# Patient Record
Sex: MALE | Race: WHITE | NOT HISPANIC OR LATINO
[De-identification: names, ages, dates, MRNs, and addresses within clinical notes are randomized per-mention and may not be internally consistent; named-entity substitution may affect disease eponyms.]

---

## 2017-07-12 ENCOUNTER — APPOINTMENT (OUTPATIENT)
Dept: GASTROENTEROLOGY | Facility: CLINIC | Age: 67
End: 2017-07-12

## 2017-07-12 VITALS
BODY MASS INDEX: 29.4 KG/M2 | HEIGHT: 71 IN | WEIGHT: 210 LBS | HEART RATE: 65 BPM | SYSTOLIC BLOOD PRESSURE: 133 MMHG | OXYGEN SATURATION: 100 % | DIASTOLIC BLOOD PRESSURE: 96 MMHG | RESPIRATION RATE: 16 BRPM

## 2017-07-12 RX ORDER — VALSARTAN AND HYDROCHLOROTHIAZIDE 80; 12.5 MG/1; MG/1
80-12.5 TABLET, FILM COATED ORAL
Qty: 7 | Refills: 0 | Status: ACTIVE | COMMUNITY
Start: 2017-04-19

## 2017-07-12 RX ORDER — CARVEDILOL 6.25 MG/1
6.25 TABLET, FILM COATED ORAL
Qty: 60 | Refills: 0 | Status: ACTIVE | COMMUNITY
Start: 2017-02-19

## 2017-07-12 RX ORDER — RABEPRAZOLE SODIUM 20 MG/1
20 TABLET, DELAYED RELEASE ORAL
Qty: 30 | Refills: 0 | Status: ACTIVE | COMMUNITY
Start: 2017-02-19

## 2017-07-12 RX ORDER — CARVEDILOL 3.12 MG/1
3.12 TABLET, FILM COATED ORAL
Qty: 180 | Refills: 0 | Status: ACTIVE | COMMUNITY
Start: 2017-06-27

## 2017-07-12 RX ORDER — MENTHOL/CAMPHOR 0.5 %-0.5%
1000 LOTION (ML) TOPICAL
Refills: 0 | Status: ACTIVE | COMMUNITY

## 2017-09-13 ENCOUNTER — APPOINTMENT (OUTPATIENT)
Dept: GASTROENTEROLOGY | Facility: GI CENTER | Age: 67
End: 2017-09-13

## 2017-11-09 ENCOUNTER — APPOINTMENT (OUTPATIENT)
Dept: GASTROENTEROLOGY | Facility: CLINIC | Age: 67
End: 2017-11-09
Payer: MEDICARE

## 2017-11-09 VITALS
SYSTOLIC BLOOD PRESSURE: 116 MMHG | OXYGEN SATURATION: 98 % | HEART RATE: 75 BPM | RESPIRATION RATE: 16 BRPM | HEIGHT: 71 IN | WEIGHT: 222 LBS | BODY MASS INDEX: 31.08 KG/M2 | DIASTOLIC BLOOD PRESSURE: 91 MMHG

## 2017-11-09 DIAGNOSIS — I38 ENDOCARDITIS, VALVE UNSPECIFIED: ICD-10-CM

## 2017-11-09 DIAGNOSIS — F15.90 OTHER STIMULANT USE, UNSPECIFIED, UNCOMPLICATED: ICD-10-CM

## 2017-11-09 DIAGNOSIS — Z86.39 PERSONAL HISTORY OF OTHER ENDOCRINE, NUTRITIONAL AND METABOLIC DISEASE: ICD-10-CM

## 2017-11-09 DIAGNOSIS — Z86.79 PERSONAL HISTORY OF OTHER DISEASES OF THE CIRCULATORY SYSTEM: ICD-10-CM

## 2017-11-09 PROCEDURE — 99204 OFFICE O/P NEW MOD 45 MIN: CPT

## 2017-11-09 RX ORDER — CHOLECALCIFEROL (VITAMIN D3) 25 MCG
25 MCG CAPSULE ORAL
Refills: 0 | Status: ACTIVE | COMMUNITY

## 2017-11-09 RX ORDER — SIMVASTATIN 80 MG/1
80 TABLET, FILM COATED ORAL
Refills: 0 | Status: ACTIVE | COMMUNITY

## 2017-11-09 RX ORDER — VALSARTAN AND HYDROCHLOROTHIAZIDE 80; 12.5 MG/1; MG/1
80-12.5 TABLET, FILM COATED ORAL
Refills: 0 | Status: ACTIVE | COMMUNITY

## 2018-08-01 ENCOUNTER — APPOINTMENT (OUTPATIENT)
Dept: GASTROENTEROLOGY | Facility: CLINIC | Age: 68
End: 2018-08-01
Payer: MEDICARE

## 2018-08-01 VITALS
OXYGEN SATURATION: 98 % | DIASTOLIC BLOOD PRESSURE: 90 MMHG | HEART RATE: 74 BPM | BODY MASS INDEX: 30.8 KG/M2 | HEIGHT: 71 IN | RESPIRATION RATE: 16 BRPM | WEIGHT: 220 LBS | SYSTOLIC BLOOD PRESSURE: 110 MMHG

## 2018-08-01 DIAGNOSIS — Z86.79 PERSONAL HISTORY OF OTHER DISEASES OF THE CIRCULATORY SYSTEM: ICD-10-CM

## 2018-08-01 PROCEDURE — 99213 OFFICE O/P EST LOW 20 MIN: CPT

## 2018-08-02 LAB
ANION GAP SERPL CALC-SCNC: 14 MMOL/L
BASOPHILS # BLD AUTO: 0.02 K/UL
BASOPHILS NFR BLD AUTO: 0.2 %
BUN SERPL-MCNC: 19 MG/DL
CALCIUM SERPL-MCNC: 8.9 MG/DL
CHLORIDE SERPL-SCNC: 104 MMOL/L
CO2 SERPL-SCNC: 23 MMOL/L
CREAT SERPL-MCNC: 1.17 MG/DL
EOSINOPHIL # BLD AUTO: 0.11 K/UL
EOSINOPHIL NFR BLD AUTO: 1.3 %
GLUCOSE SERPL-MCNC: 102 MG/DL
HCT VFR BLD CALC: 44.2 %
HGB BLD-MCNC: 14.9 G/DL
IMM GRANULOCYTES NFR BLD AUTO: 0.2 %
INR PPP: 1.04 RATIO
LYMPHOCYTES # BLD AUTO: 1.41 K/UL
LYMPHOCYTES NFR BLD AUTO: 16.9 %
MAN DIFF?: NORMAL
MCHC RBC-ENTMCNC: 30.9 PG
MCHC RBC-ENTMCNC: 33.7 GM/DL
MCV RBC AUTO: 91.7 FL
MONOCYTES # BLD AUTO: 0.77 K/UL
MONOCYTES NFR BLD AUTO: 9.2 %
NEUTROPHILS # BLD AUTO: 6.03 K/UL
NEUTROPHILS NFR BLD AUTO: 72.2 %
PLATELET # BLD AUTO: 198 K/UL
POTASSIUM SERPL-SCNC: 4.1 MMOL/L
PT BLD: 11.8 SEC
RBC # BLD: 4.82 M/UL
RBC # FLD: 14.2 %
SODIUM SERPL-SCNC: 141 MMOL/L
WBC # FLD AUTO: 8.36 K/UL

## 2018-08-22 ENCOUNTER — RESULT REVIEW (OUTPATIENT)
Age: 68
End: 2018-08-22

## 2018-08-22 ENCOUNTER — APPOINTMENT (OUTPATIENT)
Dept: GASTROENTEROLOGY | Facility: GI CENTER | Age: 68
End: 2018-08-22
Payer: MEDICARE

## 2018-08-22 ENCOUNTER — OUTPATIENT (OUTPATIENT)
Dept: OUTPATIENT SERVICES | Facility: HOSPITAL | Age: 68
LOS: 1 days | End: 2018-08-22
Payer: MEDICARE

## 2018-08-22 DIAGNOSIS — K22.70 BARRETT'S ESOPHAGUS W/OUT DYSPLASIA: ICD-10-CM

## 2018-08-22 DIAGNOSIS — K22.70 BARRETT'S ESOPHAGUS WITHOUT DYSPLASIA: ICD-10-CM

## 2018-08-22 PROCEDURE — 43239 EGD BIOPSY SINGLE/MULTIPLE: CPT

## 2018-08-22 PROCEDURE — 88305 TISSUE EXAM BY PATHOLOGIST: CPT

## 2018-08-22 PROCEDURE — 88305 TISSUE EXAM BY PATHOLOGIST: CPT | Mod: 26

## 2018-08-27 LAB — SURGICAL PATHOLOGY FINAL REPORT - CH: SIGNIFICANT CHANGE UP

## 2018-09-30 ENCOUNTER — EMERGENCY (EMERGENCY)
Facility: HOSPITAL | Age: 68
LOS: 1 days | Discharge: HOME | End: 2018-09-30
Attending: EMERGENCY MEDICINE
Payer: MEDICARE

## 2018-09-30 VITALS
DIASTOLIC BLOOD PRESSURE: 102 MMHG | SYSTOLIC BLOOD PRESSURE: 178 MMHG | OXYGEN SATURATION: 97 % | RESPIRATION RATE: 20 BRPM | TEMPERATURE: 98 F | HEART RATE: 66 BPM

## 2018-09-30 VITALS
HEART RATE: 67 BPM | DIASTOLIC BLOOD PRESSURE: 78 MMHG | OXYGEN SATURATION: 98 % | RESPIRATION RATE: 18 BRPM | SYSTOLIC BLOOD PRESSURE: 134 MMHG | TEMPERATURE: 99 F

## 2018-09-30 LAB
ANION GAP SERPL CALC-SCNC: 12 MMOL/L — SIGNIFICANT CHANGE UP (ref 5–17)
APTT BLD: 41.7 SEC — HIGH (ref 27.5–37.4)
BUN SERPL-MCNC: 12 MG/DL — SIGNIFICANT CHANGE UP (ref 8–20)
CALCIUM SERPL-MCNC: 8.2 MG/DL — LOW (ref 8.6–10.2)
CHLORIDE SERPL-SCNC: 106 MMOL/L — SIGNIFICANT CHANGE UP (ref 98–107)
CO2 SERPL-SCNC: 22 MMOL/L — SIGNIFICANT CHANGE UP (ref 22–29)
CREAT SERPL-MCNC: 0.98 MG/DL — SIGNIFICANT CHANGE UP (ref 0.5–1.3)
GLUCOSE SERPL-MCNC: 105 MG/DL — SIGNIFICANT CHANGE UP (ref 70–115)
HCT VFR BLD CALC: 30.1 % — LOW (ref 42–52)
HGB BLD-MCNC: 10.1 G/DL — LOW (ref 14–18)
INR BLD: 3.78 RATIO — HIGH (ref 0.88–1.16)
MCHC RBC-ENTMCNC: 31.2 PG — HIGH (ref 27–31)
MCHC RBC-ENTMCNC: 33.6 G/DL — SIGNIFICANT CHANGE UP (ref 32–36)
MCV RBC AUTO: 92.9 FL — SIGNIFICANT CHANGE UP (ref 80–94)
PLATELET # BLD AUTO: 311 K/UL — SIGNIFICANT CHANGE UP (ref 150–400)
POTASSIUM SERPL-MCNC: 4.2 MMOL/L — SIGNIFICANT CHANGE UP (ref 3.5–5.3)
POTASSIUM SERPL-SCNC: 4.2 MMOL/L — SIGNIFICANT CHANGE UP (ref 3.5–5.3)
PROTHROM AB SERPL-ACNC: 42.7 SEC — HIGH (ref 9.8–12.7)
RBC # BLD: 3.24 M/UL — LOW (ref 4.6–6.2)
RBC # FLD: 15 % — SIGNIFICANT CHANGE UP (ref 11–15.6)
SODIUM SERPL-SCNC: 140 MMOL/L — SIGNIFICANT CHANGE UP (ref 135–145)
TROPONIN T SERPL-MCNC: <0.01 NG/ML — SIGNIFICANT CHANGE UP (ref 0–0.06)
TROPONIN T SERPL-MCNC: <0.01 NG/ML — SIGNIFICANT CHANGE UP (ref 0–0.06)
WBC # BLD: 9.9 K/UL — SIGNIFICANT CHANGE UP (ref 4.8–10.8)
WBC # FLD AUTO: 9.9 K/UL — SIGNIFICANT CHANGE UP (ref 4.8–10.8)

## 2018-09-30 PROCEDURE — 93005 ELECTROCARDIOGRAM TRACING: CPT

## 2018-09-30 PROCEDURE — 99284 EMERGENCY DEPT VISIT MOD MDM: CPT | Mod: 25

## 2018-09-30 PROCEDURE — 93306 TTE W/DOPPLER COMPLETE: CPT

## 2018-09-30 PROCEDURE — 71045 X-RAY EXAM CHEST 1 VIEW: CPT | Mod: 26

## 2018-09-30 PROCEDURE — 85027 COMPLETE CBC AUTOMATED: CPT

## 2018-09-30 PROCEDURE — 80048 BASIC METABOLIC PNL TOTAL CA: CPT

## 2018-09-30 PROCEDURE — 71045 X-RAY EXAM CHEST 1 VIEW: CPT

## 2018-09-30 PROCEDURE — 36415 COLL VENOUS BLD VENIPUNCTURE: CPT

## 2018-09-30 PROCEDURE — 93010 ELECTROCARDIOGRAM REPORT: CPT

## 2018-09-30 PROCEDURE — 99285 EMERGENCY DEPT VISIT HI MDM: CPT

## 2018-09-30 PROCEDURE — 85730 THROMBOPLASTIN TIME PARTIAL: CPT

## 2018-09-30 PROCEDURE — 85610 PROTHROMBIN TIME: CPT

## 2018-09-30 PROCEDURE — 84484 ASSAY OF TROPONIN QUANT: CPT

## 2018-09-30 RX ORDER — SODIUM CHLORIDE 9 MG/ML
3 INJECTION INTRAMUSCULAR; INTRAVENOUS; SUBCUTANEOUS ONCE
Qty: 0 | Refills: 0 | Status: COMPLETED | OUTPATIENT
Start: 2018-09-30 | End: 2018-09-30

## 2018-09-30 RX ADMIN — SODIUM CHLORIDE 3 MILLILITER(S): 9 INJECTION INTRAMUSCULAR; INTRAVENOUS; SUBCUTANEOUS at 09:09

## 2018-09-30 NOTE — ED ADULT NURSE REASSESSMENT NOTE - NS ED NURSE REASSESS COMMENT FT1
pt in no apparent distress, plan of care explained to pt, pt verbalized understanding. pt denies any pain at the moment.

## 2018-09-30 NOTE — ED ADULT NURSE NOTE - OBJECTIVE STATEMENT
pt AOX4 c/o chest pain that lasted 20 minutes that started when pt woke up, pt denies any SOB, N/V any radiation of pain to any part of his body, pt has recent aortic valve replaced. Pt denies any stents or heart attack in the past.

## 2018-09-30 NOTE — ED PROVIDER NOTE - PROGRESS NOTE DETAILS
PT COMFORTABLE THROUGHOUT STAY IN ER. ECHO PENDING. SECOND TROP NEGATIVE. GIRLFRIEND LEXA AT BEDSIDE AND RECALLS MD WAS DR MARIA, CT SURGERY [place] [id number]

## 2018-09-30 NOTE — ED PROVIDER NOTE - CHPI ED SYMPTOMS NEG
no shortness of breath/no cough/no back pain/no diaphoresis/no dizziness/no fever/no vomiting/no nausea/no syncope/no chills

## 2018-09-30 NOTE — ED PROVIDER NOTE - NSFOLLOWUPINSTRUCTIONS_ED_ALL_ED_FT
CALL DR MILES IN THE MORNING TO FIND OUT WHAT HE WANTS YOU TO DO WITH THE WARFARIN(COUMADIN)  DO NOT TAKE YOUR MORNING WARFARIN(COUMADIN) DOSE

## 2018-09-30 NOTE — ED ADULT NURSE NOTE - CHIEF COMPLAINT QUOTE
Pt BIBA from home for sudden onset chest pain, pt stated he had AVR done at [facility] on 9/17/18. Pt stated he is also on coumadin but doesn't know why. Pt denies SOB, fever or cough.

## 2018-09-30 NOTE — CONSULT NOTE ADULT - SUBJECTIVE AND OBJECTIVE BOX
[facility]                                                        [facility]                                                             Office: [address] [address]                                                            Telephone: [phone]. Fax:[phone]          Patient is a 68y old  Male who presents with a chief complaint of     HPI:  67 y/o M with  no prior CAD s/p ? AVR 9/17/2018 at [facility] ( patient does not recall exact date or surgeon's name) presents with acute onset self -limiting epigastric pain 5/10 lasting a few minutes. Patient is currently CP free  hemodynamically stable at this time .  Initial tni (-) ECG SR with 1st AV block,  with  no acute ischemic changes,  no signs of HF on exam , sternal wound c/d/i no clicks/rubs.       Primary Complaint: mid epigastrum cp         Severity : 5/10  Radiation ;  None  Associated factors (-) triggers such as caffiene, etoh, exercise, stress  Onset: abrupt  Duration seconds to minutes ; No episodes > 1 hour,. spontaneous recovery     Patient denies orthopnea PND,LE edema , syncope or pre-syncope  Functional status > 10 mets  No limitations with AODL  Co-morbid: (-) DM, (_) CVA, (_) COPD (-) PE (-) DVT (-) Bleeding or clotting disorders  ECG: SR with ns st twa  no phenotypic evidence of Brs, HCM ,LQTS      PAST MEDICAL & SURGICAL HISTORY:  S/P aortic valve replacement  HTN (hypertension)              MEDICATIONS  (STANDING):    MEDICATIONS  (PRN):      FAMILY HISTORY:      SOCIAL HISTORY:Lives with family    CIGARETTES:None    ALCOHOL:None    REVIEW OF SYSTEMS:  CONSTITUTIONAL: No fever, weight loss, or fatigue  EYES: No eye pain, visual disturbances, or discharge  ENMT:  No difficulty hearing, tinnitus, vertigo; No sinus or throat pain  NECK: No pain or stiffness  RESPIRATORY: No cough, wheezing, chills or hemoptysis; No Shortness of Breath  CARDIOVASCULAR: (+) chest pain, palpitations, passing out, dizziness, or leg swelling  GASTROINTESTINAL: No abdominal or epigastric pain. No nausea, vomiting, or hematemesis; No diarrhea or constipation. No melena or hematochezia.  GENITOURINARY: No dysuria, frequency, hematuria, or incontinence  NEUROLOGICAL: No headaches, memory loss, loss of strength, numbness, or tremors  SKIN: No itching, burning, rashes, or lesions   LYMPH Nodes: No enlarged glands  ENDOCRINE: No heat or cold intolerance; No hair loss  MUSCULOSKELETAL: No joint pain or swelling; No muscle, back, or extremity pain  PSYCHIATRIC: No depression, anxiety, mood swings, or difficulty sleeping  HEME/LYMPH: No easy bruising, or bleeding gums  ALLERY AND IMMUNOLOGIC: No hives or eczema	    Vital Signs Last 24 Hrs  T(C): 36.7 (30 Sep 2018 11:24), Max: 36.7 (30 Sep 2018 11:24)  T(F): 98.1 (30 Sep 2018 11:24), Max: 98.1 (30 Sep 2018 11:24)  HR: 62 (30 Sep 2018 11:24) (62 - 69)  BP: 162/82 (30 Sep 2018 11:24) (146/79 - 178/102)  BP(mean): --  RR: 20 (30 Sep 2018 11:24) (18 - 20)  SpO2: 98% (30 Sep 2018 11:24) (97% - 98%)    Daily     Daily     I&O's Detail      PHYSICAL EXAM:  Appearance: Normal, well nourished	  HEENT:   Normal oral mucosa, PERRL, EOMI, sclera non-icteric	  Lymphatic: No cervical lymphadenopathy  Cardiovascular: Normal S1 S2, No JVD, No cardiac murmurs, No carotid bruits, No peripheral edema  Respiratory: Lungs clear to auscultation	  Psychiatry: A & O x 3, Mood & affect appropriate  Gastrointestinal:  Soft, Non-tender, + BS, no bruits	  Skin: No rashes, No ecchymoses, No cyanosis  Neurologic: Grossly non-focal with full strength in all four extremities  Extremities: Normal range of motion, No clubbing, cyanosis or edema  Vascular: Peripheral pulses palpable 2+ bilaterally      INTERPRETATION OF TELEMETRY:    ECG: SR with 1st avb    LABS:                        10.1   9.9   )-----------( 311      ( 30 Sep 2018 09:23 )             30.1     09-30    140  |  106  |  12.0  ----------------------------<  105  4.2   |  22.0  |  0.98    Ca    8.2<L>      30 Sep 2018 09:23        CARDIAC MARKERS ( 30 Sep 2018 09:23 )  CKx     / CKMBx     / Troponin T<0.01 ng/mL /      PT/INR - ( 30 Sep 2018 09:23 )   PT: 42.7 sec;   INR: 3.78 ratio         PTT - ( 30 Sep 2018 09:23 )  PTT:41.7 sec    I&O's Summary    BNP  RADIOLOGY & ADDITIONAL STUDIES:

## 2018-09-30 NOTE — ED PROVIDER NOTE - NOTES
SEEN IN ER  ECHO ORDERED. IF ECHO AND TROP #2 NORMAL CAN DC. ALSO REC HOLD COUMADIN TONIGHT AND FU WITH CARDIO TOMORROW

## 2018-09-30 NOTE — ED ADULT NURSE NOTE - CHPI ED NUR SYMPTOMS NEG
no syncope/no shortness of breath/no chills/no nausea/no vomiting/no back pain/no diaphoresis/no dizziness/no fever/no congestion

## 2018-09-30 NOTE — ED PROVIDER NOTE - OBJECTIVE STATEMENT
CC CHEST PAIN. 67 YO MALE S/P VALVE SURGERY AT [facility] LAST WEEK. CP STARTED TODAY. LEFT SIDED NO RADIATING. NO SOB OR DIAPHORESIS OR NAUSEA. NO PAIN AT THIS TIME. PAIN DIFFICULT TO DESCRIBE. PT STATES IT WAS LIKE AN ACHE BUT DID NOT LAST. CC CHEST PAIN. 67 YO MALE S/P VALVE SURGERY AT [facility] LAST WEEK. CP STARTED TODAY. LEFT SIDED NO RADIATING. NO SOB OR DIAPHORESIS OR NAUSEA. NO PAIN AT THIS TIME. PAIN DIFFICULT TO DESCRIBE. PT STATES IT WAS LIKE AN ACHE BUT DID NOT LAST. PT DOES NOT RECALL NAME OF SURGEON. HE IS CONCERNED ABOUT HIS WARFARIN DOSE AND WHETHER OR NOT HIS DOSE NEEDS TO BE ADJUSTED. NO OTHER COMPLAINTS. NO PAIN AT INCISION RIGHT CHEST WALL.  MED HXHTN (hypertension)    S/P aortic valve replacement

## 2018-09-30 NOTE — ED PROVIDER NOTE - MEDICAL DECISION MAKING DETAILS
S/P AORTIC VALVE REPAIR. HERE FOR CP. DR ROBINSON CALLED. SEEN BY OUR CARDIO, DR FIORE. AWAITING ECHO AND CB FROM HIS MD

## 2018-09-30 NOTE — CONSULT NOTE ADULT - ASSESSMENT
69 y/o M with  no prior CAD s/p ? AVR 9/17/2018 at [facility] ( patient does not recall exact date or surgeon's name) presents with acute onset self -limiting epigastric pain 5/10 lasting a few minutes. Patient is currently CP free  hemodynamically stable at this time .  Initial tni (-) ECG SR with 1st AV block,  with  no acute ischemic changes,  no signs of HF on exam , sternal wound c/d/i no clicks/rubs.    Imp:    CP: Atypical for CAD/ACS. Presumed non-obstructive CAD on pre-AVR cardiac cath .   Agree with trending tni , check TTE, hold Coumadin  goal inr 2-3  try to obtain op report from [facility]  Keep K>4 mg >2  d/w with Dr Willett

## 2018-09-30 NOTE — ED ADULT TRIAGE NOTE - CHIEF COMPLAINT QUOTE
Pt BIBA from home for sudden onset chest pain, pt stated he had AVR done at [facility] on 9/17/18. Pt stated he is also on coumadin but doesn't know why. Pt denies SOB, fever or cough.

## 2018-10-01 NOTE — ED POST DISCHARGE NOTE - RESULT SUMMARY
peripheral R lung opacity, f/u with cardiothoracic surgeon Dre, patient is not having any fevers or cough

## 2018-10-01 NOTE — ED POST DISCHARGE NOTE - DETAILS
spoke to brigette from [facility] office. spoke to brigette from [facility] office, will f/u with patient, made aware of R lung opacity spoke to brigette from [facility] office, will f/u with patient, made aware of R lung opacity, will FAX results [phone], T:[phone]

## 2020-10-13 NOTE — ED ADULT NURSE NOTE - PAIN: BODY LOCATION
"   LOS: 9 days   Patient Care Team:  Marisol Larson APRN as PCP - General  Shwetha Barber MD as Consulting Physician (Nephrology)  Edy Thomas MD as Consulting Physician (Cardiology)    Chief Complaint:     Subjective     Patient sitting up in chair with a visitor present.  He denies any complaints or concerns at this time.      Subjective:  Symptoms:  No shortness of breath, chest pain or chest pressure.    Diet:  Adequate intake.    Activity level: Impaired due to weakness.    Pain:  He reports no pain.        History taken from: patient chart RN    Objective     Vital Signs  Temp:  [97.6 °F (36.4 °C)-98.2 °F (36.8 °C)] 98.2 °F (36.8 °C)  Heart Rate:  [63-86] 63  Resp:  [17-19] 18  BP: (145-179)/() 145/81    Objective:  General Appearance:  Comfortable, well-appearing, in no acute distress and not in pain.    Vital signs: (most recent): Blood pressure 145/81, pulse 63, temperature 98.2 °F (36.8 °C), temperature source Oral, resp. rate 18, height 182.9 cm (72\"), weight 105 kg (232 lb), SpO2 95 %.  Vital signs are normal.    Output: Producing urine.    Lungs:  Normal effort and normal respiratory rate.  Breath sounds clear to auscultation.    Heart: Normal rate.  Irregular rhythm.    Abdomen: Abdomen is soft.  Bowel sounds are normal.   There is no abdominal tenderness.     Extremities: There is no dependent edema.    Neurological: Patient is alert and oriented to person, place and time.  (Periods of confusion. Very forgetful).    Skin:  Warm and dry.              Results Review:      Lab Results (last 24 hours)     Procedure Component Value Units Date/Time    Magnesium [[id number]]  (Normal) Collected: 10/13/20 0335    Specimen: Blood Updated: 10/13/20 0402     Magnesium 2.1 mg/dL     CBC & Differential [[id number]]  (Abnormal) Collected: 10/13/20 0335    Specimen: Blood Updated: 10/13/20 0348    Narrative:      The following orders were created for panel order CBC & Differential.  Procedure     " "                          Abnormality         Status                     ---------                               -----------         ------                     CBC Auto Differential[[id number]]        Abnormal            Final result                 Please view results for these tests on the individual orders.    CBC Auto Differential [[id number]]  (Abnormal) Collected: 10/13/20 0335    Specimen: Blood Updated: 10/13/20 0348     WBC 8.20 10*3/mm3      RBC 4.99 10*6/mm3      Hemoglobin 13.8 g/dL      Hematocrit 42.5 %      MCV 85.3 fL      MCH 27.8 pg      MCHC 32.6 g/dL      RDW 19.3 %      RDW-SD 56.9 fl      MPV 8.1 fL      Platelets 182 10*3/mm3      Neutrophil % 66.6 %      Lymphocyte % 18.8 %      Monocyte % 11.3 %      Eosinophil % 2.0 %      Basophil % 1.3 %      Neutrophils, Absolute 5.50 10*3/mm3      Lymphocytes, Absolute 1.50 10*3/mm3      Monocytes, Absolute 0.90 10*3/mm3      Eosinophils, Absolute 0.20 10*3/mm3      Basophils, Absolute 0.10 10*3/mm3      nRBC 0.1 /100 WBC          Imaging Results (Last 24 Hours)     ** No results found for the last 24 hours. **           I reviewed the patient's new clinical results.    Medication Review:    Hospital Medications (active)       Dose Frequency Start End    acetaminophen (TYLENOL) 160 MG/5ML solution 650 mg 650 mg Every 4 Hours PRN 10/4/2020     Admin Instructions: Do not exceed 4 grams of acetaminophen in a 24 hr period.<BR><BR>If given for pain, use the following pain scale: <BR>Mild Pain = Pain Score of 1-3, CPOT 1-2<BR>Moderate Pain = Pain Score of 4-6, CPOT 3-4<BR>Severe Pain = Pain Score of 7-10, CPOT 5-8    Route: Oral    Linked Group 1: \"Or\" Linked Group Details        acetaminophen (TYLENOL) suppository 650 mg 650 mg Every 4 Hours PRN 10/4/2020     Admin Instructions: Do not exceed 4 grams of acetaminophen in a 24 hr period.<BR><BR>If given for pain, use the following pain scale: <BR>Mild Pain = Pain Score of 1-3, CPOT 1-2<BR>Moderate Pain = Pain " "Score of 4-6, CPOT 3-4<BR>Severe Pain = Pain Score of 7-10, CPOT 5-8    Route: Rectal    Linked Group 1: \"Or\" Linked Group Details        acetaminophen (TYLENOL) tablet 650 mg 650 mg Every 4 Hours PRN 10/4/2020     Admin Instructions: Do not exceed 4 grams of acetaminophen in a 24 hr period.<BR><BR>If given for pain, use the following pain scale: <BR>Mild Pain = Pain Score of 1-3, CPOT 1-2<BR>Moderate Pain = Pain Score of 4-6, CPOT 3-4<BR>Severe Pain = Pain Score of 7-10, CPOT 5-8    Route: Oral    Linked Group 1: \"Or\" Linked Group Details        allopurinol (ZYLOPRIM) tablet 100 mg 100 mg Daily 10/5/2020     Admin Instructions: ([facility]) Take with food if GI upset occurs.    Route: Oral    aspirin EC tablet 81 mg 81 mg Daily 10/5/2020     Admin Instructions: Herbal/drug interaction: Avoid use with ginkgo biloba. Do not crush or chew.<BR>Do not exceed 4 grams of aspirin in a 24 hr period.<BR><BR>If given for pain, use the following pain scale: <BR>Mild Pain = Pain Score of 1-3, CPOT 1-2<BR>Moderate Pain = Pain Score of 4-6, CPOT 3-4<BR>Severe Pain = Pain Score of 7-10, CPOT 5-8    Route: Oral    atorvastatin (LIPITOR) tablet 10 mg 10 mg Nightly 10/4/2020     Admin Instructions: Avoid grapefruit juice.    Route: Oral    betamethasone dipropionate 0.05 % cream 1 application 1 application Every 12 Hours Scheduled 10/8/2020     Admin Instructions: Apply to psoriasis    Route: Topical    bisacodyl (DULCOLAX) suppository 10 mg 10 mg Daily PRN 10/4/2020     Route: Rectal    calcium carbonate (TUMS) chewable tablet 500 mg (200 mg elemental) 2 tablet 2 Times Daily PRN 10/4/2020     Admin Instructions: One tablet contains 200 mg elemental calcium.  Take with food.    Route: Oral    dilTIAZem CD (CARDIZEM CD) 24 hr capsule 180 mg 180 mg Every 24 Hours Scheduled 10/5/2020     Admin Instructions: 1st dose now<BR><BR>## monitor to stop diltiazem gtt ##<BR>Caution: Look alike/sound alike drug alert.   Swallow capsule whole. Do not " "crush, chew, or open capsule. Avoid grapefruit juice. Maximum simvastatin dose 10 mg while taking dilTIAZem.    Route: Oral    hydrALAZINE (APRESOLINE) tablet 50 mg 50 mg 3 Times Daily 10/5/2020     Admin Instructions: Caution: Look alike/sound alike drug alert    Route: Oral    influenza vac split quad (FLUZONE,FLUARIX,AFLURIA,FLULAVAL) injection 0.5 mL 0.5 mL During Hospitalization 10/4/2020     Admin Instructions: **Do Not Administer if Temperature Greater Than 102F & Notify Pharmacy** Pneumococcal & Influenza Vaccines May Be Given at the Same Time in SEPARATE Injections.<BR>Do not administer if temperature greater than 102F & notify pharmacy. Pneumococcal and influenza vaccines may be given at the same time in SEPARATE injections.    Route: Intramuscular    labetalol (NORMODYNE,TRANDATE) injection 20 mg 20 mg Every 6 Hours PRN 10/6/2020     Admin Instructions: As needed for SBP greater than 160<BR>Give by slow IV Push each 20mg (or less) over 2 minutes    Route: Intravenous    losartan (COZAAR) tablet 50 mg 50 mg Every 24 Hours Scheduled 10/5/2020     Route: Oral    magnesium hydroxide (MILK OF MAGNESIA) suspension 2400 mg/10mL 10 mL 10 mL Daily PRN 10/4/2020     Route: Oral    melatonin tablet 5 mg 5 mg Nightly PRN 10/4/2020     Route: Oral    metoprolol succinate XL (TOPROL-XL) 24 hr tablet 50 mg 50 mg Every 24 Hours Scheduled 10/5/2020     Admin Instructions: Do not crush or chew.    Route: Oral    nitroglycerin (NITROSTAT) SL tablet 0.4 mg 0.4 mg Every 5 Minutes PRN 10/4/2020     Admin Instructions: If Pain Unrelieved After 3 Doses Notify MD    Route: Sublingual    ondansetron (ZOFRAN) injection 4 mg 4 mg Every 6 Hours PRN 10/4/2020     Admin Instructions: If BOTH ondansetron (ZOFRAN) and promethazine (PHENERGAN) are ordered use ondansetron first and THEN promethazine IF ondansetron is ineffective.    Route: Intravenous    Linked Group 2: \"Or\" Linked Group Details        ondansetron (ZOFRAN) tablet 4 mg 4 mg " "Every 6 Hours PRN 10/4/2020     Admin Instructions: If BOTH ondansetron (ZOFRAN) and promethazine (PHENERGAN) are ordered use ondansetron first and THEN promethazine IF ondansetron is ineffective.    Route: Oral    Linked Group 2: \"Or\" Linked Group Details        potassium chloride (K-DUR,KLOR-CON) CR tablet 40 mEq 40 mEq As Needed 10/5/2020     Admin Instructions: Potassium 3.1 or Less Give KCl 40 mEq q4h x3 Doses <BR>Potassium 3.2 - 3.6 Give KCl 40 mEq q4h x2 Doses <BR><BR>Check Potassium 4 Hours After Last Dose Given <BR>Check Magnesium if Potassium Level Remains Low After Replacement <BR>DO NOT GIVE if CrCl is Less Than 30 mL/minute or Urine Output Less Than 30 mL/hr    Route: Oral    Linked Group 3: \"Or\" Linked Group Details        potassium chloride (KLOR-CON) packet 40 mEq 40 mEq As Needed 10/5/2020     Admin Instructions: Potassium 3.1 or Less Give KCl 40 mEq q4h x3 Doses <BR>Potassium 3.2 - 3.6 Give KCl 40 mEq q4h x2 Doses <BR><BR>Check Potassium 4 Hours After Last Dose Given <BR>Check Magnesium if Potassium Level Remains Low After Replacement <BR>DO NOT GIVE if CrCl is Less Than 30 mL/minute or Urine Output Less Than 30 mL/hr    Route: Oral    Linked Group 3: \"Or\" Linked Group Details        potassium chloride 10 mEq in 100 mL IVPB 10 mEq Every 1 Hour PRN 10/5/2020     Admin Instructions: Peripheral or Central IV<BR>Potassium 3.1 or Less Give KCl 10 mEq/100 mL NS IV q1h x6 Doses<BR>Potassium 3.2 - 3.6 Give KCl 10 mEq/100 mL NS q1h x4 Doses<BR><BR>Check Potassium 4 Hours After Last Dose Given<BR>Check Magnesium if Potassium Remains Low After Replacement<BR>DO NOT GIVE if CrCl is Less Than 30 mL/minute or Urine Output Less Than 30 mL/hr. <BR><BR>Rates Greater Than 10 mEq/hr Require ECG Monitoring.<BR>OUTPATIENT/NON-MONITORED UNITS: Potassium Chloride standard bolus infusion rate is a maximum of 10 mEq/hr on unmonitored patients<BR><BR>MONITORED UNITS: Potassium Chloride standard bolus infusion rate is a " "maximum of 20 mEq/hr on ECG monitored patients ONLY<BR>    Route: Intravenous    Linked Group 3: \"Or\" Linked Group Details        risperiDONE (risperDAL) tablet 0.5 mg 0.5 mg Every 12 Hours Scheduled 10/4/2020     Admin Instructions: Caution: Look alike/sound alike drug alert    Route: Oral    rivaroxaban (XARELTO) tablet 20 mg 20 mg Daily With Dinner 10/4/2020     Admin Instructions: If giving by tube: suspend in 50mL water, administer, and immediately follow with enteral feeding.<BR>Give with food.    Route: Oral    sodium bicarbonate tablet 650 mg 650 mg 3 Times Daily 10/4/2020     Route: Oral    sodium chloride 0.9 % flush 10 mL 10 mL As Needed 10/4/2020     Route: Intravenous    Cosign for Ordering: Accepted by Mandeep Ohara MD on 10/4/2020  3:34 PM    sodium chloride 0.9 % flush 10 mL 10 mL Every 12 Hours Scheduled 10/4/2020     Route: Intravenous    sodium chloride 0.9 % flush 10 mL 10 mL As Needed 10/4/2020     Route: Intravenous    spironolactone (ALDACTONE) tablet 12.5 mg 12.5 mg Daily 10/5/2020     Route: Oral           Assessment/Plan       Atrial fibrillation with RVR (CMS/HCC)    Hypertensive emergency    Chronic atrial fibrillation (CMS/HCC)    Chronic diastolic CHF (congestive heart failure) (CMS/[facility])    CKD (chronic kidney disease) stage 3, GFR 30-59 ml/min    Essential hypertension    Gambling disorder, persistent    Coronary artery disease involving native coronary artery of native heart without angina pectoris    Psoriasis    History of CVA (cerebrovascular accident)    GERD (gastroesophageal reflux disease)    Medically noncompliant    Mild cognitive impairment    Dyspnea          Plan:   (Awaiting expedited appeal for patient to be discharged to [facility].  He is unsafe to go home.).       Marisol Larson, CLARISSA  10/13/20  17:01 EDT        " Left:/chest

## 2021-05-16 ENCOUNTER — INPATIENT (INPATIENT)
Facility: HOSPITAL | Age: 71
LOS: 3 days | Discharge: HOME | DRG: 377 | End: 2021-05-20
Attending: HOSPITALIST | Admitting: HOSPITALIST
Payer: MEDICARE

## 2021-05-16 VITALS
DIASTOLIC BLOOD PRESSURE: 70 MMHG | OXYGEN SATURATION: 95 % | WEIGHT: 167.99 LBS | SYSTOLIC BLOOD PRESSURE: 109 MMHG | HEART RATE: 85 BPM | HEIGHT: 68 IN | RESPIRATION RATE: 20 BRPM

## 2021-05-16 DIAGNOSIS — K92.2 GASTROINTESTINAL HEMORRHAGE, UNSPECIFIED: ICD-10-CM

## 2021-05-16 PROBLEM — Z95.2 PRESENCE OF PROSTHETIC HEART VALVE: Chronic | Status: ACTIVE | Noted: 2018-09-30

## 2021-05-16 PROBLEM — I10 ESSENTIAL (PRIMARY) HYPERTENSION: Chronic | Status: ACTIVE | Noted: 2018-09-30

## 2021-05-16 LAB
ABO RH CONFIRMATION: SIGNIFICANT CHANGE UP
ALBUMIN SERPL ELPH-MCNC: 3.4 G/DL — SIGNIFICANT CHANGE UP (ref 3.3–5.2)
ALP SERPL-CCNC: 92 U/L — SIGNIFICANT CHANGE UP (ref 40–120)
ALT FLD-CCNC: <5 U/L — SIGNIFICANT CHANGE UP
ANION GAP SERPL CALC-SCNC: 10 MMOL/L — SIGNIFICANT CHANGE UP (ref 5–17)
APTT BLD: 32.2 SEC — SIGNIFICANT CHANGE UP (ref 27.5–35.5)
AST SERPL-CCNC: 20 U/L — SIGNIFICANT CHANGE UP
BASOPHILS # BLD AUTO: 0.04 K/UL — SIGNIFICANT CHANGE UP (ref 0–0.2)
BASOPHILS # BLD AUTO: 0.05 K/UL — SIGNIFICANT CHANGE UP (ref 0–0.2)
BASOPHILS NFR BLD AUTO: 0.3 % — SIGNIFICANT CHANGE UP (ref 0–2)
BASOPHILS NFR BLD AUTO: 0.4 % — SIGNIFICANT CHANGE UP (ref 0–2)
BILIRUB SERPL-MCNC: 0.7 MG/DL — SIGNIFICANT CHANGE UP (ref 0.4–2)
BLD GP AB SCN SERPL QL: SIGNIFICANT CHANGE UP
BUN SERPL-MCNC: 33 MG/DL — HIGH (ref 8–20)
CALCIUM SERPL-MCNC: 8.4 MG/DL — LOW (ref 8.6–10.2)
CHLORIDE SERPL-SCNC: 110 MMOL/L — HIGH (ref 98–107)
CO2 SERPL-SCNC: 23 MMOL/L — SIGNIFICANT CHANGE UP (ref 22–29)
CREAT SERPL-MCNC: 1.16 MG/DL — SIGNIFICANT CHANGE UP (ref 0.5–1.3)
EOSINOPHIL # BLD AUTO: 0.05 K/UL — SIGNIFICANT CHANGE UP (ref 0–0.5)
EOSINOPHIL # BLD AUTO: 0.11 K/UL — SIGNIFICANT CHANGE UP (ref 0–0.5)
EOSINOPHIL NFR BLD AUTO: 0.4 % — SIGNIFICANT CHANGE UP (ref 0–6)
EOSINOPHIL NFR BLD AUTO: 0.8 % — SIGNIFICANT CHANGE UP (ref 0–6)
GLUCOSE SERPL-MCNC: 214 MG/DL — HIGH (ref 70–99)
HCT VFR BLD CALC: 35.2 % — LOW (ref 39–50)
HCT VFR BLD CALC: 36.8 % — LOW (ref 39–50)
HCT VFR BLD CALC: 41.3 % — SIGNIFICANT CHANGE UP (ref 39–50)
HGB BLD-MCNC: 11.8 G/DL — LOW (ref 13–17)
HGB BLD-MCNC: 12.4 G/DL — LOW (ref 13–17)
HGB BLD-MCNC: 13.9 G/DL — SIGNIFICANT CHANGE UP (ref 13–17)
IMM GRANULOCYTES NFR BLD AUTO: 0.7 % — SIGNIFICANT CHANGE UP (ref 0–1.5)
IMM GRANULOCYTES NFR BLD AUTO: 0.7 % — SIGNIFICANT CHANGE UP (ref 0–1.5)
INR BLD: 1.18 RATIO — HIGH (ref 0.88–1.16)
LACTATE BLDV-MCNC: 1.6 MMOL/L — SIGNIFICANT CHANGE UP (ref 0.5–2)
LYMPHOCYTES # BLD AUTO: 0.9 K/UL — LOW (ref 1–3.3)
LYMPHOCYTES # BLD AUTO: 0.94 K/UL — LOW (ref 1–3.3)
LYMPHOCYTES # BLD AUTO: 6.8 % — LOW (ref 13–44)
LYMPHOCYTES # BLD AUTO: 7.3 % — LOW (ref 13–44)
MCHC RBC-ENTMCNC: 31.8 PG — SIGNIFICANT CHANGE UP (ref 27–34)
MCHC RBC-ENTMCNC: 31.9 PG — SIGNIFICANT CHANGE UP (ref 27–34)
MCHC RBC-ENTMCNC: 32.3 PG — SIGNIFICANT CHANGE UP (ref 27–34)
MCHC RBC-ENTMCNC: 33.5 GM/DL — SIGNIFICANT CHANGE UP (ref 32–36)
MCHC RBC-ENTMCNC: 33.7 GM/DL — SIGNIFICANT CHANGE UP (ref 32–36)
MCHC RBC-ENTMCNC: 33.7 GM/DL — SIGNIFICANT CHANGE UP (ref 32–36)
MCV RBC AUTO: 94.4 FL — SIGNIFICANT CHANGE UP (ref 80–100)
MCV RBC AUTO: 95.1 FL — SIGNIFICANT CHANGE UP (ref 80–100)
MCV RBC AUTO: 95.8 FL — SIGNIFICANT CHANGE UP (ref 80–100)
MONOCYTES # BLD AUTO: 0.84 K/UL — SIGNIFICANT CHANGE UP (ref 0–0.9)
MONOCYTES # BLD AUTO: 1.14 K/UL — HIGH (ref 0–0.9)
MONOCYTES NFR BLD AUTO: 6.1 % — SIGNIFICANT CHANGE UP (ref 2–14)
MONOCYTES NFR BLD AUTO: 9.2 % — SIGNIFICANT CHANGE UP (ref 2–14)
NEUTROPHILS # BLD AUTO: 10.16 K/UL — HIGH (ref 1.8–7.4)
NEUTROPHILS # BLD AUTO: 11.7 K/UL — HIGH (ref 1.8–7.4)
NEUTROPHILS NFR BLD AUTO: 82.1 % — HIGH (ref 43–77)
NEUTROPHILS NFR BLD AUTO: 85.2 % — HIGH (ref 43–77)
OB PNL STL: POSITIVE
PLATELET # BLD AUTO: 166 K/UL — SIGNIFICANT CHANGE UP (ref 150–400)
PLATELET # BLD AUTO: 167 K/UL — SIGNIFICANT CHANGE UP (ref 150–400)
PLATELET # BLD AUTO: 184 K/UL — SIGNIFICANT CHANGE UP (ref 150–400)
POTASSIUM SERPL-MCNC: 4.8 MMOL/L — SIGNIFICANT CHANGE UP (ref 3.5–5.3)
POTASSIUM SERPL-SCNC: 4.8 MMOL/L — SIGNIFICANT CHANGE UP (ref 3.5–5.3)
PROT SERPL-MCNC: 5.7 G/DL — LOW (ref 6.6–8.7)
PROTHROM AB SERPL-ACNC: 13.6 SEC — SIGNIFICANT CHANGE UP (ref 10.6–13.6)
RBC # BLD: 3.7 M/UL — LOW (ref 4.2–5.8)
RBC # BLD: 3.9 M/UL — LOW (ref 4.2–5.8)
RBC # BLD: 4.31 M/UL — SIGNIFICANT CHANGE UP (ref 4.2–5.8)
RBC # FLD: 14.1 % — SIGNIFICANT CHANGE UP (ref 10.3–14.5)
RBC # FLD: 14.3 % — SIGNIFICANT CHANGE UP (ref 10.3–14.5)
RBC # FLD: 15 % — HIGH (ref 10.3–14.5)
SODIUM SERPL-SCNC: 143 MMOL/L — SIGNIFICANT CHANGE UP (ref 135–145)
TROPONIN T SERPL-MCNC: <0.01 NG/ML — SIGNIFICANT CHANGE UP (ref 0–0.06)
TROPONIN T SERPL-MCNC: <0.01 NG/ML — SIGNIFICANT CHANGE UP (ref 0–0.06)
WBC # BLD: 12.38 K/UL — HIGH (ref 3.8–10.5)
WBC # BLD: 13.73 K/UL — HIGH (ref 3.8–10.5)
WBC # BLD: 15.95 K/UL — HIGH (ref 3.8–10.5)
WBC # FLD AUTO: 12.38 K/UL — HIGH (ref 3.8–10.5)
WBC # FLD AUTO: 13.73 K/UL — HIGH (ref 3.8–10.5)
WBC # FLD AUTO: 15.95 K/UL — HIGH (ref 3.8–10.5)

## 2021-05-16 PROCEDURE — 99291 CRITICAL CARE FIRST HOUR: CPT | Mod: CS

## 2021-05-16 PROCEDURE — 43255 EGD CONTROL BLEEDING ANY: CPT

## 2021-05-16 PROCEDURE — 71045 X-RAY EXAM CHEST 1 VIEW: CPT | Mod: 26,76

## 2021-05-16 PROCEDURE — 74177 CT ABD & PELVIS W/CONTRAST: CPT | Mod: 26,MG

## 2021-05-16 PROCEDURE — G1004: CPT

## 2021-05-16 PROCEDURE — 99222 1ST HOSP IP/OBS MODERATE 55: CPT | Mod: 25

## 2021-05-16 RX ORDER — FENTANYL CITRATE 50 UG/ML
0.5 INJECTION INTRAVENOUS
Qty: 2500 | Refills: 0 | Status: DISCONTINUED | OUTPATIENT
Start: 2021-05-16 | End: 2021-05-16

## 2021-05-16 RX ORDER — MIDAZOLAM HYDROCHLORIDE 1 MG/ML
1 INJECTION, SOLUTION INTRAMUSCULAR; INTRAVENOUS ONCE
Refills: 0 | Status: DISCONTINUED | OUTPATIENT
Start: 2021-05-16 | End: 2021-05-16

## 2021-05-16 RX ORDER — ETOMIDATE 2 MG/ML
20 INJECTION INTRAVENOUS ONCE
Refills: 0 | Status: COMPLETED | OUTPATIENT
Start: 2021-05-16 | End: 2021-05-16

## 2021-05-16 RX ORDER — SODIUM CHLORIDE 9 MG/ML
1000 INJECTION, SOLUTION INTRAVENOUS ONCE
Refills: 0 | Status: COMPLETED | OUTPATIENT
Start: 2021-05-16 | End: 2021-05-16

## 2021-05-16 RX ORDER — CHLORHEXIDINE GLUCONATE 213 G/1000ML
1 SOLUTION TOPICAL
Refills: 0 | Status: DISCONTINUED | OUTPATIENT
Start: 2021-05-16 | End: 2021-05-20

## 2021-05-16 RX ORDER — CARBIDOPA AND LEVODOPA 25; 100 MG/1; MG/1
1 TABLET ORAL
Refills: 0 | Status: DISCONTINUED | OUTPATIENT
Start: 2021-05-16 | End: 2021-05-17

## 2021-05-16 RX ORDER — FENTANYL CITRATE 50 UG/ML
50 INJECTION INTRAVENOUS ONCE
Refills: 0 | Status: DISCONTINUED | OUTPATIENT
Start: 2021-05-16 | End: 2021-05-16

## 2021-05-16 RX ORDER — CALCIUM GLUCONATE 100 MG/ML
2 VIAL (ML) INTRAVENOUS ONCE
Refills: 0 | Status: COMPLETED | OUTPATIENT
Start: 2021-05-16 | End: 2021-05-16

## 2021-05-16 RX ORDER — PANTOPRAZOLE SODIUM 20 MG/1
8 TABLET, DELAYED RELEASE ORAL
Qty: 80 | Refills: 0 | Status: DISCONTINUED | OUTPATIENT
Start: 2021-05-16 | End: 2021-05-20

## 2021-05-16 RX ORDER — CARBIDOPA AND LEVODOPA 25; 100 MG/1; MG/1
1 TABLET ORAL ONCE
Refills: 0 | Status: COMPLETED | OUTPATIENT
Start: 2021-05-16 | End: 2021-05-16

## 2021-05-16 RX ORDER — METOCLOPRAMIDE HCL 10 MG
5 TABLET ORAL ONCE
Refills: 0 | Status: COMPLETED | OUTPATIENT
Start: 2021-05-16 | End: 2021-05-16

## 2021-05-16 RX ORDER — CARBIDOPA AND LEVODOPA 25; 100 MG/1; MG/1
0.5 TABLET ORAL ONCE
Refills: 0 | Status: COMPLETED | OUTPATIENT
Start: 2021-05-16 | End: 2021-05-16

## 2021-05-16 RX ORDER — ONDANSETRON 8 MG/1
4 TABLET, FILM COATED ORAL ONCE
Refills: 0 | Status: COMPLETED | OUTPATIENT
Start: 2021-05-16 | End: 2021-05-16

## 2021-05-16 RX ORDER — PANTOPRAZOLE SODIUM 20 MG/1
80 TABLET, DELAYED RELEASE ORAL ONCE
Refills: 0 | Status: COMPLETED | OUTPATIENT
Start: 2021-05-16 | End: 2021-05-16

## 2021-05-16 RX ORDER — NOREPINEPHRINE BITARTRATE/D5W 8 MG/250ML
0.05 PLASTIC BAG, INJECTION (ML) INTRAVENOUS
Qty: 8 | Refills: 0 | Status: DISCONTINUED | OUTPATIENT
Start: 2021-05-16 | End: 2021-05-17

## 2021-05-16 RX ORDER — PROPOFOL 10 MG/ML
20 INJECTION, EMULSION INTRAVENOUS
Qty: 1000 | Refills: 0 | Status: DISCONTINUED | OUTPATIENT
Start: 2021-05-16 | End: 2021-05-17

## 2021-05-16 RX ORDER — CHLORHEXIDINE GLUCONATE 213 G/1000ML
15 SOLUTION TOPICAL EVERY 12 HOURS
Refills: 0 | Status: DISCONTINUED | OUTPATIENT
Start: 2021-05-16 | End: 2021-05-17

## 2021-05-16 RX ORDER — NOREPINEPHRINE BITARTRATE/D5W 8 MG/250ML
0.05 PLASTIC BAG, INJECTION (ML) INTRAVENOUS
Qty: 8 | Refills: 0 | Status: DISCONTINUED | OUTPATIENT
Start: 2021-05-16 | End: 2021-05-16

## 2021-05-16 RX ORDER — DEXMEDETOMIDINE HYDROCHLORIDE IN 0.9% SODIUM CHLORIDE 4 UG/ML
0.2 INJECTION INTRAVENOUS
Qty: 200 | Refills: 0 | Status: DISCONTINUED | OUTPATIENT
Start: 2021-05-16 | End: 2021-05-17

## 2021-05-16 RX ORDER — SUCCINYLCHOLINE CHLORIDE 100 MG/5ML
100 SYRINGE (ML) INTRAVENOUS ONCE
Refills: 0 | Status: COMPLETED | OUTPATIENT
Start: 2021-05-16 | End: 2021-05-16

## 2021-05-16 RX ADMIN — CARBIDOPA AND LEVODOPA 1 TABLET(S): 25; 100 TABLET ORAL at 16:34

## 2021-05-16 RX ADMIN — ETOMIDATE 20 MILLIGRAM(S): 2 INJECTION INTRAVENOUS at 19:48

## 2021-05-16 RX ADMIN — Medication 7.14 MICROGRAM(S)/KG/MIN: at 20:34

## 2021-05-16 RX ADMIN — PROPOFOL 9.14 MICROGRAM(S)/KG/MIN: 10 INJECTION, EMULSION INTRAVENOUS at 20:14

## 2021-05-16 RX ADMIN — FENTANYL CITRATE 50 MICROGRAM(S): 50 INJECTION INTRAVENOUS at 20:44

## 2021-05-16 RX ADMIN — PANTOPRAZOLE SODIUM 80 MILLIGRAM(S): 20 TABLET, DELAYED RELEASE ORAL at 14:41

## 2021-05-16 RX ADMIN — SODIUM CHLORIDE 1000 MILLILITER(S): 9 INJECTION, SOLUTION INTRAVENOUS at 17:02

## 2021-05-16 RX ADMIN — FENTANYL CITRATE 3.81 MICROGRAM(S)/KG/HR: 50 INJECTION INTRAVENOUS at 20:46

## 2021-05-16 RX ADMIN — Medication 5 MILLIGRAM(S): at 23:50

## 2021-05-16 RX ADMIN — CARBIDOPA AND LEVODOPA 0.5 TABLET(S): 25; 100 TABLET ORAL at 18:10

## 2021-05-16 RX ADMIN — DEXMEDETOMIDINE HYDROCHLORIDE IN 0.9% SODIUM CHLORIDE 3.81 MICROGRAM(S)/KG/HR: 4 INJECTION INTRAVENOUS at 23:50

## 2021-05-16 RX ADMIN — PANTOPRAZOLE SODIUM 10 MG/HR: 20 TABLET, DELAYED RELEASE ORAL at 15:17

## 2021-05-16 RX ADMIN — ONDANSETRON 4 MILLIGRAM(S): 8 TABLET, FILM COATED ORAL at 19:10

## 2021-05-16 RX ADMIN — Medication 200 GRAM(S): at 21:27

## 2021-05-16 RX ADMIN — PANTOPRAZOLE SODIUM 10 MG/HR: 20 TABLET, DELAYED RELEASE ORAL at 23:46

## 2021-05-16 RX ADMIN — FENTANYL CITRATE 50 MICROGRAM(S): 50 INJECTION INTRAVENOUS at 23:00

## 2021-05-16 RX ADMIN — FENTANYL CITRATE 50 MICROGRAM(S): 50 INJECTION INTRAVENOUS at 22:26

## 2021-05-16 RX ADMIN — MIDAZOLAM HYDROCHLORIDE 1 MILLIGRAM(S): 1 INJECTION, SOLUTION INTRAMUSCULAR; INTRAVENOUS at 23:46

## 2021-05-16 RX ADMIN — FENTANYL CITRATE 50 MICROGRAM(S): 50 INJECTION INTRAVENOUS at 21:00

## 2021-05-16 RX ADMIN — Medication 100 MILLIGRAM(S): at 19:49

## 2021-05-16 NOTE — CONSULT NOTE ADULT - ASSESSMENT
Patient with active GI bleeding from the stomach/hiatal hernia on the CT angio.  Will recommend ICU admission with possible intubation and then upper endoscopy.  Transfuse packed RBC.  Due to Parkinson's, Reglan may be not recommended.  IV erythromycin is not available in the hospital.  Will place an NG tube and perform lavage.  IV fluid hydration.

## 2021-05-16 NOTE — ED ADULT NURSE REASSESSMENT NOTE - NS ED NURSE REASSESS COMMENT FT1
pt. brought back to CC for episode of vomiting bright red blood seen by MD. pt. c/o feeling dizzy. NSR on cm.

## 2021-05-16 NOTE — ED PROVIDER NOTE - GASTROINTESTINAL, MLM
Abdomen soft, non-tender, no guarding. Rectal exam Abdomen soft, non-tender, no guarding. Rectal exam: non-tender will red tinged stool

## 2021-05-16 NOTE — ED ADULT NURSE REASSESSMENT NOTE - NS ED NURSE REASSESS COMMENT FT1
MICU PA is aware of patient's blood pressure at this time. Pt is to receive an additional unit of PRBC.

## 2021-05-16 NOTE — ED ADULT TRIAGE NOTE - CHIEF COMPLAINT QUOTE
pt c/o bright red blood in his vomit that began an hr ago. pt also reports blood in his stool. denies any pain or use of anticoagulant therapy

## 2021-05-16 NOTE — H&P ADULT - HISTORY OF PRESENT ILLNESS
72 y/o male with pmhx of Parkinson's disease, HTN, GERD who presents to ER c/o hematemesis and hematochezia x 12 hours. He has vomiting bright red blood three times in last two hours. On arrival to ER he is hypotensive and had two more episodes of hematemesis. Hgb 13.9-->12.4. CTA with active gastric extravasation. Transfused 2 units prbc with improvement in bp. Electively intubated in ER for EGD.     Patient had EGD and colonoscopy done 3 months that were negative. Denies any anticoagulation. He takes aspirin daily.

## 2021-05-16 NOTE — ED ADULT NURSE REASSESSMENT NOTE - NS ED NURSE REASSESS COMMENT FT1
pt. had episode of bright red vomit, c/o nausea. meds given as documented. emergency release of blood.

## 2021-05-16 NOTE — ED ADULT NURSE REASSESSMENT NOTE - NS ED NURSE REASSESS COMMENT FT1
Assumed care from previous RN. Pt is A&Ox4, in NAD. Pt presented to ED c/o bloody emesis + blood in stool since this morning. Pt aware of CT results and hernia + possible GI bleed. Pt denies SOB or chest pain. Skin is warm, dry and color appropriate for race. Pt is not vomiting at this time. Pt aware of POC and need for endoscopy in the ICU as well as need for intubation prior to endoscopy. Pt's systolic BP to be > 100 prior to intubation. Will continue to monitor and make Dr. Hess aware of patient's BP.

## 2021-05-16 NOTE — BRIEF OPERATIVE NOTE - OPERATION/FINDINGS
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 4 cm Long hiatal hernia. Large amount of blood noted in the hiatal hernia and gastric fundus and body. Polypoid lesion with active bleeding and clots noted in the gastric cardia. Epinephrine injection around the bleeding area with good blanching. Then 6 endoclip placed for control of the bleeding. Normal gastric body/antrum. Normal pylorus and normal duodenum

## 2021-05-16 NOTE — H&P ADULT - ASSESSMENT
72 y/o male with pmhx of HTN, parkinson's disease now with:    1. GIB, upper  2. hemorrhagic/hypovolemic shock  3. acute respiratory failure    NEURO: propofol for sedation  CVS: hemodyncamics responded to blood products. avoid pressors and further IVF. keep MAP > 65.  PULM: vent bundle in place. on 40%/+5. intubated for airway protection. SBT pending procedure.   GI: npo. plan for EGD in ICU with dr shepard.   RENAL: keep k > 4, Mg > 2. 2 grams calcium gluconate given with ongoing blood product resuscitation.   ID: no active issues  ENDO: no active issues  HEME: s/p 2 prbc. trend cbc q4-6 hours. large bore access established. hold chemical dvt prophylaxis  DISPO: full code.    40 minutes of critical care time spent providing medical care for patient's acute illness/conditions that impairs at least one vital organ system and/or poses a high risk of imminent or life threatening deterioration in the patient's condition. It includes time spent evaluating and treating the patient's acute illness as well as time spent reviewing labs, radiology, discussing goals of care with patient and/or patient's family, and discussing the case with a multidisciplinary team in an effort to prevent further life threatening deterioration or end organ damage. This time is independent of any procedures performed.

## 2021-05-16 NOTE — ED ADULT NURSE NOTE - OBJECTIVE STATEMENT
pt. brought to CC. 70 y/o m a&ox3 comes to ED c/o bright blood vomit and dark with some bright red blood in it starting this morning. pt. denies chest pain, sob, nausea, fevers. pt. feels tired and weak. pt. in no apparent distress at this time, breathing even and unlabored.

## 2021-05-16 NOTE — CONSULT NOTE ADULT - SUBJECTIVE AND OBJECTIVE BOX
HPI:Pleasant 71-year-old man with a history of Parkinson disease, hypertension, aortic valve replacement and presented to the emergency department due to bright red blood vomiting.  He had a regular bowel movement this morning.  After that he had a bowel movement with blood during the middle of the day.  He has vomited about 3 times since morning.  This has never happened before.  No NSAID use.  No alcohol use.  The patient had EGD and colonoscopy about 2 to 3 months ago.  He does not recall any abnormal findings except polyp.  No heart disease or lung disease.    PAST MEDICAL & SURGICAL HISTORY:  HTN (hypertension)    S/P aortic valve replacement    No significant past surgical history        ROS:  No Heartburn, regurgitation, dysphagia, odynophagia.  No dyspepsia  No abdominal pain.    No Nausea, vomiting.  + Bleeding. + hematemesis.   No diarrhea.    No hematochezia  No weight loss, anorexia.  No edema.      MEDICATIONS  (STANDING):  pantoprazole Infusion 8 mG/Hr (10 mL/Hr) IV Continuous <Continuous>    MEDICATIONS  (PRN):      Allergies    No Known Allergies    Intolerances        SOCIAL HISTORY:NC    ENDOSCOPIC/GI HISTORY:EGD and colonoscopy recently    FAMILY HISTORY:  No pertinent family history in first degree relatives        Vital Signs Last 24 Hrs  T(C): 36.4 (16 May 2021 15:30), Max: 36.4 (16 May 2021 15:30)  T(F): 97.5 (16 May 2021 15:30), Max: 97.5 (16 May 2021 15:30)  HR: 99 (16 May 2021 18:36) (85 - 99)  BP: 81/58 (16 May 2021 18:36) (73/55 - 111/76)  BP(mean): --  RR: 18 (16 May 2021 18:36) (16 - 20)  SpO2: 99% (16 May 2021 18:36) (95% - 99%)    PHYSICAL EXAM:    GENERAL: NAD,   HEAD:  Atraumatic, Normocephalic  EYES: EOMI, PERRLA, conjunctiva and sclera clear  ENMT: Blood in the mouth  NECK: Supple, No JVD, Normal thyroid  CHEST/LUNG: Clear to percussion bilaterally; No rales, rhonchi, wheezing, or rubs  HEART: Regular rate and rhythm; No murmurs, rubs, or gallops  ABDOMEN: Soft, Nontender, Nondistended; Bowel sounds present  EXTREMITIES:  2+ Peripheral Pulses, No clubbing, cyanosis, or edema  LYMPH: No lymphadenopathy noted  SKIN: No rashes or lesions      LABS:                        12.4   12.38 )-----------( 166      ( 16 May 2021 16:59 )             36.8     05-16    143  |  110<H>  |  33.0<H>  ----------------------------<  214<H>  4.8   |  23.0  |  1.16    Ca    8.4<L>      16 May 2021 14:49    TPro  5.7<L>  /  Alb  3.4  /  TBili  0.7  /  DBili  x   /  AST  20  /  ALT  <5  /  AlkPhos  92  05-16    PT/INR - ( 16 May 2021 14:49 )   PT: 13.6 sec;   INR: 1.18 ratio         PTT - ( 16 May 2021 14:49 )  PTT:32.2 sec       LIVER FUNCTIONS - ( 16 May 2021 14:49 )  Alb: 3.4 g/dL / Pro: 5.7 g/dL / ALK PHOS: 92 U/L / ALT: <5 U/L / AST: 20 U/L / GGT: x               RADIOLOGY & ADDITIONAL STUDIES:  < from: CT Abdomen and Pelvis w/ IV Cont (05.16.21 @ 18:05) >     EXAM:  CT ABDOMEN AND PELVIS IC                          PROCEDURE DATE:  05/16/2021          INTERPRETATION:  CLINICAL INFORMATION: GI bleeding. Bloody emesis.    COMPARISON: None.    CONTRAST/COMPLICATIONS:  IV Contrast: Omnipaque 300  94 cc administered   6 cc discarded  Oral Contrast: NONE  Complications: None reported at time of study completion    PROCEDURE:  CT of the Abdomen and Pelvis was performed.  Precontrast, Arterial and Delayed phases were performed.  Sagittal and coronal reformats were performed.    FINDINGS:  LOWER CHEST: Large hiatal hernia.    LIVER: Scattered hepatic cysts and subcentimeter hypodense foci that are too small to characterize.  BILE DUCTS: Normal caliber.  GALLBLADDER: Within normal limits.  SPLEEN: Withinnormal limits.  PANCREAS: Within normal limits.  ADRENALS: Within normal limits.  KIDNEYS/URETERS: 1.7 cm hypodense left upper pole renal mass measuring greater than cyst density. The right kidney is unremarkable. No hydronephrosis.    BLADDER: Within normal limits.  REPRODUCTIVE ORGANS: Prostate within normal limits.    BOWEL: The stomach is distended with heterogeneous material, also extending into the large hiatal hernia with fluid. There is a small 1.3 cm intraluminal hypervascular focus within the hernia sac on the arterial phase (series 6, image 26, series 11, image 57), which changes configuration on the delayed phase, concerning for small active extravasation. Colonic diverticulosis without evidence of diverticulitis. No bowel wall thickening or obstruction. Appendix is not visualized. No evidence of inflammation in the pericecal region.  PERITONEUM: No ascites.  VESSELS: Within normal limits.  RETROPERITONEUM/LYMPH NODES: No lymphadenopathy.  ABDOMINAL WALL: Within normal limits.  BONES: Mild spinal degenerative changes.    IMPRESSION:  Distended stomach with fluid and heterogeneous mixed density, and large hiatal hernia. A 1.3 cm hypervascular focus within the herniated stomach, is suspicious for active extravasation/active GI bleed. Correlate with upper endoscopy.    Indeterminate left renal nodule. Correlate with ultrasound when clinically appropriate.    I discussed the findings with Dr. Hess of the ED on 5/16/2021 at 6:40 PM.            AMEENA NASH MD; Attending Radiologist  This document has been electronically signed. May 16 2021  6:40PM    < end of copied text >

## 2021-05-16 NOTE — ED ADULT NURSE REASSESSMENT NOTE - NS ED NURSE REASSESS COMMENT FT1
Patient brought to MICU with RN Jesus and respiratory therapist at bedside. pt's vital signs stable upon arrival.

## 2021-05-16 NOTE — H&P ADULT - NSHPPHYSICALEXAM_GEN_ALL_CORE
General: Normal appearing in no acute distress resting comfortably in bed.   Head: Normocephalic, atraumatic.   EENT: dried blood in mouth. Sclera white. PERRL, EOM intact. Secretions normal.   PULM: Breath sounds clear to auscultation symmetrically throughout all lung fields. No wheezing, rhonchi, or rales. No use of accessory muscles.  CVS: Regular rate and rhythm. No murmurs or rubs. Pulses 2+ on upper and lower extremities bilaterally.   GI: nondistended with bowel sounds normoactive in all four quadrants. No tenderness to light or deep palpation.   Neuro: A& O x 3. nonfocal  Skin: No lesions, rashes, ulcers. Extremities equally warm bilaterally.

## 2021-05-16 NOTE — BRIEF OPERATIVE NOTE - NSICDXBRIEFPOSTOP_GEN_ALL_CORE_FT
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

## 2021-05-16 NOTE — ED PROVIDER NOTE - OBJECTIVE STATEMENT
70 y/o male with PMHx of Parkinson's, HTN, aortic valve replacement, c/o bloody vomit x 2-3, dark stool with red in it. PT denies hx of alcohol use. No abdominal pain.

## 2021-05-16 NOTE — ED PROVIDER NOTE - CRITICAL CARE ATTENDING CONTRIBUTION TO CARE
pt presented with upper gi bleed, recurrent and then dropped pressure, displaying signs of hemorrhagic shock, requiring emergent transfusion, GI consultation, intubation

## 2021-05-16 NOTE — ED ADULT NURSE REASSESSMENT NOTE - NS ED NURSE REASSESS COMMENT FT1
Dr. Hess and respiratory at bedside to intubate patient in preparation for endoscopy in ICU. Pt is aware of POC.

## 2021-05-16 NOTE — ED ADULT NURSE REASSESSMENT NOTE - NS ED NURSE REASSESS COMMENT FT1
Norepinephrine infusion started at this time. Dr. Hess at bedside is aware. Central line being placed.

## 2021-05-17 LAB
A1C WITH ESTIMATED AVERAGE GLUCOSE RESULT: 5.6 % — SIGNIFICANT CHANGE UP (ref 4–5.6)
ALBUMIN SERPL ELPH-MCNC: 2.9 G/DL — LOW (ref 3.3–5.2)
ALP SERPL-CCNC: 68 U/L — SIGNIFICANT CHANGE UP (ref 40–120)
ALT FLD-CCNC: 8 U/L — SIGNIFICANT CHANGE UP
ANION GAP SERPL CALC-SCNC: 7 MMOL/L — SIGNIFICANT CHANGE UP (ref 5–17)
AST SERPL-CCNC: 14 U/L — SIGNIFICANT CHANGE UP
BILIRUB SERPL-MCNC: 0.6 MG/DL — SIGNIFICANT CHANGE UP (ref 0.4–2)
BUN SERPL-MCNC: 44 MG/DL — HIGH (ref 8–20)
CA-I BLD-SCNC: 1.16 MMOL/L — SIGNIFICANT CHANGE UP (ref 1.15–1.33)
CALCIUM SERPL-MCNC: 8.1 MG/DL — LOW (ref 8.6–10.2)
CHLORIDE SERPL-SCNC: 111 MMOL/L — HIGH (ref 98–107)
CO2 SERPL-SCNC: 22 MMOL/L — SIGNIFICANT CHANGE UP (ref 22–29)
COVID-19 SPIKE DOMAIN AB INTERP: POSITIVE
COVID-19 SPIKE DOMAIN ANTIBODY RESULT: >250 U/ML — HIGH
CREAT SERPL-MCNC: 0.91 MG/DL — SIGNIFICANT CHANGE UP (ref 0.5–1.3)
ESTIMATED AVERAGE GLUCOSE: 114 MG/DL — SIGNIFICANT CHANGE UP (ref 68–114)
GAS PNL BLDA: SIGNIFICANT CHANGE UP
GLUCOSE SERPL-MCNC: 141 MG/DL — HIGH (ref 70–99)
HCT VFR BLD CALC: 29 % — LOW (ref 39–50)
HCT VFR BLD CALC: 31.4 % — LOW (ref 39–50)
HCT VFR BLD CALC: 33.8 % — LOW (ref 39–50)
HCV AB S/CO SERPL IA: 0.13 S/CO — SIGNIFICANT CHANGE UP (ref 0–0.99)
HCV AB SERPL-IMP: SIGNIFICANT CHANGE UP
HGB BLD-MCNC: 10.2 G/DL — LOW (ref 13–17)
HGB BLD-MCNC: 10.6 G/DL — LOW (ref 13–17)
HGB BLD-MCNC: 11.4 G/DL — LOW (ref 13–17)
MAGNESIUM SERPL-MCNC: 1.7 MG/DL — SIGNIFICANT CHANGE UP (ref 1.6–2.6)
MCHC RBC-ENTMCNC: 31.8 PG — SIGNIFICANT CHANGE UP (ref 27–34)
MCHC RBC-ENTMCNC: 32 PG — SIGNIFICANT CHANGE UP (ref 27–34)
MCHC RBC-ENTMCNC: 32.6 PG — SIGNIFICANT CHANGE UP (ref 27–34)
MCHC RBC-ENTMCNC: 33.7 GM/DL — SIGNIFICANT CHANGE UP (ref 32–36)
MCHC RBC-ENTMCNC: 33.8 GM/DL — SIGNIFICANT CHANGE UP (ref 32–36)
MCHC RBC-ENTMCNC: 35.2 GM/DL — SIGNIFICANT CHANGE UP (ref 32–36)
MCV RBC AUTO: 92.7 FL — SIGNIFICANT CHANGE UP (ref 80–100)
MCV RBC AUTO: 94.3 FL — SIGNIFICANT CHANGE UP (ref 80–100)
MCV RBC AUTO: 94.9 FL — SIGNIFICANT CHANGE UP (ref 80–100)
PHOSPHATE SERPL-MCNC: 3.2 MG/DL — SIGNIFICANT CHANGE UP (ref 2.4–4.7)
PLATELET # BLD AUTO: 125 K/UL — LOW (ref 150–400)
PLATELET # BLD AUTO: 129 K/UL — LOW (ref 150–400)
PLATELET # BLD AUTO: 149 K/UL — LOW (ref 150–400)
POTASSIUM SERPL-MCNC: 4.3 MMOL/L — SIGNIFICANT CHANGE UP (ref 3.5–5.3)
POTASSIUM SERPL-SCNC: 4.3 MMOL/L — SIGNIFICANT CHANGE UP (ref 3.5–5.3)
PROT SERPL-MCNC: 4.8 G/DL — LOW (ref 6.6–8.7)
RBC # BLD: 3.13 M/UL — LOW (ref 4.2–5.8)
RBC # BLD: 3.33 M/UL — LOW (ref 4.2–5.8)
RBC # BLD: 3.56 M/UL — LOW (ref 4.2–5.8)
RBC # FLD: 15.3 % — HIGH (ref 10.3–14.5)
RBC # FLD: 15.4 % — HIGH (ref 10.3–14.5)
RBC # FLD: 15.7 % — HIGH (ref 10.3–14.5)
SARS-COV-2 IGG+IGM SERPL QL IA: >250 U/ML — HIGH
SARS-COV-2 IGG+IGM SERPL QL IA: POSITIVE
SODIUM SERPL-SCNC: 140 MMOL/L — SIGNIFICANT CHANGE UP (ref 135–145)
WBC # BLD: 14.59 K/UL — HIGH (ref 3.8–10.5)
WBC # BLD: 15.73 K/UL — HIGH (ref 3.8–10.5)
WBC # BLD: 15.86 K/UL — HIGH (ref 3.8–10.5)
WBC # FLD AUTO: 14.59 K/UL — HIGH (ref 3.8–10.5)
WBC # FLD AUTO: 15.73 K/UL — HIGH (ref 3.8–10.5)
WBC # FLD AUTO: 15.86 K/UL — HIGH (ref 3.8–10.5)

## 2021-05-17 PROCEDURE — 99233 SBSQ HOSP IP/OBS HIGH 50: CPT

## 2021-05-17 RX ORDER — NOREPINEPHRINE BITARTRATE/D5W 8 MG/250ML
0.05 PLASTIC BAG, INJECTION (ML) INTRAVENOUS
Qty: 8 | Refills: 0 | Status: DISCONTINUED | OUTPATIENT
Start: 2021-05-17 | End: 2021-05-17

## 2021-05-17 RX ORDER — RASAGILINE 0.5 MG/1
0.5 TABLET ORAL DAILY
Refills: 0 | Status: DISCONTINUED | OUTPATIENT
Start: 2021-05-17 | End: 2021-05-17

## 2021-05-17 RX ORDER — AMANTADINE HCL 100 MG
1 CAPSULE ORAL
Qty: 0 | Refills: 0 | DISCHARGE

## 2021-05-17 RX ORDER — SODIUM CHLORIDE 9 MG/ML
1000 INJECTION, SOLUTION INTRAVENOUS ONCE
Refills: 0 | Status: COMPLETED | OUTPATIENT
Start: 2021-05-17 | End: 2021-05-17

## 2021-05-17 RX ORDER — SUCRALFATE 1 G
1 TABLET ORAL
Refills: 0 | Status: DISCONTINUED | OUTPATIENT
Start: 2021-05-17 | End: 2021-05-17

## 2021-05-17 RX ORDER — ROSUVASTATIN CALCIUM 5 MG/1
10 TABLET ORAL AT BEDTIME
Refills: 0 | Status: DISCONTINUED | OUTPATIENT
Start: 2021-05-17 | End: 2021-05-17

## 2021-05-17 RX ORDER — ROSUVASTATIN CALCIUM 5 MG/1
1 TABLET ORAL
Qty: 0 | Refills: 0 | DISCHARGE

## 2021-05-17 RX ORDER — RASAGILINE 0.5 MG/1
1 TABLET ORAL
Qty: 0 | Refills: 0 | DISCHARGE

## 2021-05-17 RX ORDER — MEN-PHOR .5; .5 G/G; G/G
1 LOTION TOPICAL
Refills: 0 | Status: DISCONTINUED | OUTPATIENT
Start: 2021-05-17 | End: 2021-05-20

## 2021-05-17 RX ORDER — MAGNESIUM SULFATE 500 MG/ML
1 VIAL (ML) INJECTION ONCE
Refills: 0 | Status: COMPLETED | OUTPATIENT
Start: 2021-05-17 | End: 2021-05-17

## 2021-05-17 RX ORDER — BUPROPION HYDROCHLORIDE 150 MG/1
1 TABLET, EXTENDED RELEASE ORAL
Qty: 0 | Refills: 0 | DISCHARGE

## 2021-05-17 RX ORDER — AMANTADINE HCL 100 MG
100 CAPSULE ORAL
Refills: 0 | Status: DISCONTINUED | OUTPATIENT
Start: 2021-05-17 | End: 2021-05-17

## 2021-05-17 RX ORDER — CARBIDOPA AND LEVODOPA 25; 100 MG/1; MG/1
0 TABLET ORAL
Qty: 0 | Refills: 0 | DISCHARGE

## 2021-05-17 RX ADMIN — CHLORHEXIDINE GLUCONATE 15 MILLILITER(S): 213 SOLUTION TOPICAL at 05:29

## 2021-05-17 RX ADMIN — CHLORHEXIDINE GLUCONATE 1 APPLICATION(S): 213 SOLUTION TOPICAL at 05:29

## 2021-05-17 RX ADMIN — CARBIDOPA AND LEVODOPA 1 TABLET(S): 25; 100 TABLET ORAL at 11:41

## 2021-05-17 RX ADMIN — Medication 100 GRAM(S): at 06:39

## 2021-05-17 RX ADMIN — PANTOPRAZOLE SODIUM 10 MG/HR: 20 TABLET, DELAYED RELEASE ORAL at 10:07

## 2021-05-17 RX ADMIN — SODIUM CHLORIDE 1000 MILLILITER(S): 9 INJECTION, SOLUTION INTRAVENOUS at 13:52

## 2021-05-17 NOTE — PROGRESS NOTE ADULT - ASSESSMENT
Massive esophageal bleed.  Recommend 72 hours of continuous pantoprazole infusion since the EGD.  Would keep NPO for now with minimal sips of water with medications and ice chips.  No objection to use of sucralfate, but be mindful this will impair the ability for him to absorb PD medications.  Monitor hemoglobin Q12H and transfuse as needed.  Will continue to follow.    Discussed with Dr. More.

## 2021-05-17 NOTE — PROGRESS NOTE ADULT - ASSESSMENT
72 yo male with pmhx of parkinson's disease, HTN, s/p aortic valve replacement presented to the ER on 5/16 c/o 2-3 episodes of hematemesis and melena and was admitted to MICU services for upper GI bleed and hemorrhagic shock. Pt is currently off pressors, hemodynamically stable/normotensive and was extubated this AM- now on NC 2L/min.     1. Upper GI bleed  2. Hemorrhagic shock    Heme:  -SCD's for DVT ppx; no AC due to GI bleed.   -H/H stable   -Monitor and trend H/H  CV:  -Pt was started on Levophed on 5/16 for shock state. Pt is currently off pressors. Hemodynamically stable- /75.   -Maintain MAP>65  -Continue to monitor vitals q1hr  -Continue rosuvastatin for HLD  Respiratory:  -Continue to monitor respiratory status.  -Pt is currently on NC 2L/min, SpO2 99%  GI:  -NPO for now with minimal sips of water with medication and ice chips.   -Continue protonix gtt for GI ppx  -Start sucralfate  -GI following  Renal:  -Monitor BUN/Cr and electrolytes  -Replete electrolytes as needed  -D/C machado  -Monitor I&O's q1hr  ID:  -Leukocytosis with left shift downtrending (5/16 13.75 --> 12.38 --> 15.95 --> 15.86 --> 5/17 14.59). Afebrile.   -Continue to monitor and trend WBC and vitals  Neuro:   -Continue to assess mental status  -Continue medications for Parkinson's disease 70 yo male with pmhx of parkinson's disease, HTN, s/p aortic valve replacement presented to the ER on 5/16 c/o 2-3 episodes of hematemesis and melena and was admitted to MICU services for upper GI bleed and hemorrhagic shock. Pt is currently off pressors, hemodynamically stable/normotensive and was extubated this AM- now on NC 2L/min.     1. Upper GI bleed  2. Hemorrhagic shock    Heme:  -SCD's for DVT ppx; no AC due to GI bleed.   -H/H 5/17  11.4/ 33.8 -->10.6/31.4  -Repeat CBC   -Monitor and trend H/H  CV:  -Pt was started on Levophed on 5/16 for shock state. Pt is currently off pressors. Hemodynamically stable- /75.   -Maintain MAP>65  -Continue to monitor vitals q1hr  -Continue rosuvastatin for HLD  Respiratory:  -Continue to monitor respiratory status.  -Pt is currently on NC 2L/min, SpO2 99%  GI:  -NPO for now with minimal sips of water with medication and ice chips.   -Continue protonix gtt for GI ppx  -Start sucralfate  -GI following  Renal:  -Monitor BUN/Cr and electrolytes  -Replete electrolytes as needed  -D/C machado  -Monitor I&O's q1hr  ID:  -Leukocytosis with left shift downtrending (5/16 13.75 --> 12.38 --> 15.95 --> 15.86 --> 5/17 14.59). Afebrile.   -Continue to monitor and trend WBC and vitals  Neuro:   -Continue to assess mental status  -Continue medications for Parkinson's disease 70 yo male with pmhx of parkinson's disease, HTN, s/p aortic valve replacement presented to the ER on 5/16 c/o 2-3 episodes of hematemesis and melena and was admitted to MICU services for upper GI bleed and hemorrhagic shock. Pt is currently off pressors, hemodynamically stable/normotensive and was extubated this AM    1. Upper GI bleed  2. Hemorrhagic shock  3. Acute blood loss anemia   4. HTN    Neuro: Mentation at baseline.   -Continue medications for Parkinson's disease    CV:  - Able to come off pressors this morning. Monitoring end points of perfusion.   -Maintain MAP>65  - Given 1L IVF for orthostatic hypotension   -Continue rosuvastatin for HLD    Respiratory:  - Extubated to NC this morning.   -Pt is currently on NC 2L/min, maintaining O2 sat >90%.   GI:  - Keep NPO for now.   -Continue protonix gtt   -Start sucralfate   -GI following    Renal:  -Monitor BUN/Cr and electrolytes  -Replete electrolytes as needed  -D/C machado  -Monitor I&O's q1hr  ID:  -Leukocytosis with left shift downtrending (5/16 13.75 --> 12.38 --> 15.95 --> 15.86 --> 5/17 14.59). Afebrile.   -Continue to monitor and trend WBC and vitals    Heme:  -SCD's for DVT ppx; no AC due to GI bleed.   -H/H 5/17  11.4/ 33.8 -->10.6/31.4. W/ Hg drifting down, plan to repeat this afternoon. Will transfuse for HG <7 or any major active bleeding.  - CBC q6hr

## 2021-05-17 NOTE — PROGRESS NOTE ADULT - SUBJECTIVE AND OBJECTIVE BOX
Chief Complaint: This is a 71y old man patient being seen in follow-up consultation for UGIB.    HPI / 24H events:  Patient underwent EGD yesterday for active UGIB s/p hemoclipping and injection of diluted epinephrine.  Extubated this morning without issue.  Has no GI complaints.  Still requiring vasopressor support.    ROS: A 14-point review of systems was reviewed and was otherwise negative save what was reported in the HPI.    PAST MEDICAL/SURGICAL HISTORY:  HTN (hypertension)    S/P aortic valve replacement    Parkinsons disease    No significant past surgical history      MEDICATIONS  (STANDING):  carbidopa/levodopa  25/100 1 Tablet(s) Oral four times a day  carbidopa/levodopa CR 50/200 1 Tablet(s) Oral <User Schedule>  chlorhexidine 0.12% Liquid 15 milliLiter(s) Oral Mucosa every 12 hours  chlorhexidine 2% Cloths 1 Application(s) Topical <User Schedule>  norepinephrine Infusion 0.05 MICROgram(s)/kG/Min (7.14 mL/Hr) IV Continuous <Continuous>  pantoprazole Infusion 8 mG/Hr (10 mL/Hr) IV Continuous <Continuous>    MEDICATIONS  (PRN):    No Known Allergies    T(C): 36.4 (05-17-21 @ 07:32), Max: 36.9 (05-16-21 @ 19:36)  HR: 78 (05-17-21 @ 09:00) (60 - 101)  BP: 104/75 (05-17-21 @ 09:00) (65/50 - 151/101)  RR: 24 (05-17-21 @ 09:00) (14 - 24)  SpO2: 100% (05-17-21 @ 09:00) (78% - 100%)  Mode: CPAP with PS  FiO2: 40  PEEP: 5  PC: 10    I&O's Summary    16 May 2021 07:01  -  17 May 2021 07:00  --------------------------------------------------------  IN: 289.2 mL / OUT: 610 mL / NET: -320.8 mL      PHYSICAL EXAM:  Constitutional: Well-developed, well-nourished, in no apparent distress  Eyes: Sclerae anicteric  ENMT: Mucus membranes moist, no oropharyngeal thrush noted  Neck: No thyroid nodules appreciated, no significant cervical or supraclavicular lymphadenopathy  Respiratory: Breathing nonlabored; clear to auscultation  Cardiovascular: Regular rate and rhythm  Gastrointestinal: Soft, nontender, nondistended, normoactive bowel sounds; no hepatosplenomegaly appreciated; no rebound tenderness or involuntary guarding  Extremities: No clubbing, cyanosis or edema  Neurological: No asterixis  Skin: No jaundice  Lymph Nodes: No significant lymphadenopathy  Musculoskeletal: No significant peripheral atrophy  Psychiatric: Affect and mood appropriate      LABS:  ABG - ( 17 May 2021 03:40 )  pH, Arterial: 7.34  pH, Blood: x     /  pCO2: 41    /  pO2: 198   / HCO3: 21    / Base Excess: -3.7  /  SaO2: 100                    11.4   15.86 )-----------( 149      ( 05-17 @ 05:19 )             33.8                11.8   15.95 )-----------( 184      ( 05-16 @ 23:41 )             35.2                12.4   12.38 )-----------( 166      ( 05-16 @ 16:59 )             36.8                13.9   13.73 )-----------( 167      ( 05-16 @ 14:49 )             41.3       05-17    140  |  111<H>  |  44.0<H>  ----------------------------<  141<H>  4.3   |  22.0  |  0.91    Ca    8.1<L>      17 May 2021 05:19  Phos  3.2     05-17  Mg     1.7     05-17    TPro  4.8<L>  /  Alb  2.9<L>  /  TBili  0.6  /  DBili  x   /  AST  14  /  ALT  8   /  AlkPhos  68  05-17    LIVER FUNCTIONS - ( 17 May 2021 05:19 )  Alb: 2.9 g/dL / Pro: 4.8 g/dL / ALK PHOS: 68 U/L / ALT: 8 U/L / AST: 14 U/L / GGT: x           PT/INR - ( 16 May 2021 14:49 )   PT: 13.6 sec;   INR: 1.18 ratio       PTT - ( 16 May 2021 14:49 )  PTT:32.2 sec

## 2021-05-17 NOTE — PROGRESS NOTE ADULT - SUBJECTIVE AND OBJECTIVE BOX
72 yo male with pmhx of Parkinson's disease, HTN, s/p aortic valve replacement presented to the ER on 5/16 c/o 2-3 episodes of hematemesis and melena; Pt denies ETOH use. Pt had EGD and colonoscopy performed 2-3 months ago, which was negative for pathology. Pt was admitted to MICU services for upper GI bleed and hemorrhagic shock on 5/16. Pt is s/p 3uPRBC's with improvements in BP, 1 FFP, 1 plts. He was electively intubated on 5/16 for airway protection. EGD was performed in ICU with Dr Griffin on 5/16- EGD indicated polypoid lesion with active bleeding and clots in the gastric cardia. Epi was injected around the bleeding area along with the placement of 6 endoclips to control the bleeding. Pt was subsequently extubated 5/17 without complications and placed on nonrebreather mask.     5/17/21: Pt was seen and examined at the bedside, resting comfortably in bed on NC 2L/min. Pt offering complaints of nausea, no vomiting. Pt had 1 episode of a melanotic BM this morning. Denies chest pain, palpitations, SOB, or abdominal pain. Pressors were stopped- BP stable, 104/75, MAP 86. Respirations nonlabored, in no acute respiratory distress on NC 2L/min, SpO2 99%    PAST MEDICAL & SURGICAL HISTORY:  HTN (hypertension)  S/P aortic valve replacement  Parkinsons disease    Review of Systems:  CONSTITUTIONAL: No fever, chills, or fatigue  EYES: +left eye pain. No visual disturbances or discharge  ENMT:  No difficulty hearing; No throat pain  NECK: No pain or stiffness  RESPIRATORY: No cough, wheezing, chills or hemoptysis; No shortness of breath  CARDIOVASCULAR: No chest pain, palpitations, dizziness, or leg swelling  GASTROINTESTINAL: +melena, nausea. No abdominal or epigastric pain. No vomiting, or hematemesis; No diarrhea or constipation. No hematochezia.  GENITOURINARY: No dysuria or hematuria  NEUROLOGICAL: No headaches, loss of strength, or paresthesias.   SKIN: No rashes, or lesions   MUSCULOSKELETAL: No joint pain or swelling  PSYCHIATRIC: No depression, anxiety, mood swings, or difficulty sleeping    Medications:  carbidopa/levodopa  25/100 1 Tablet(s) Oral four times a day  carbidopa/levodopa CR 50/200 1 Tablet(s) Oral <User Schedule>  pantoprazole Infusion 8 mG/Hr IV Continuous <Continuous>  chlorhexidine 0.12% Liquid 15 milliLiter(s) Oral Mucosa every 12 hours  chlorhexidine 2% Cloths 1 Application(s) Topical <User Schedule>    Mode: CPAP with PS  FiO2: 40  PEEP: 5  PC: 10    ICU Vital Signs Last 24 Hrs  T(C): 36.4 (17 May 2021 11:36), Max: 36.9 (16 May 2021 19:36)  T(F): 97.6 (17 May 2021 11:36), Max: 98.5 (16 May 2021 19:36)  HR: 93 (17 May 2021 11:00) (60 - 101)  BP: 96/72 (17 May 2021 11:00) (65/50 - 151/101)  BP(mean): 81 (17 May 2021 11:00) (55 - 111)  ABP: --  ABP(mean): --  RR: 16 (17 May 2021 11:00) (14 - 35)  SpO2: 100% (17 May 2021 11:00) (78% - 100%)    ABG - ( 17 May 2021 03:40 )  pH, Arterial: 7.34  pH, Blood: x     /  pCO2: 41    /  pO2: 198   / HCO3: 21    / Base Excess: -3.7  /  SaO2: 100       I&O's Detail    16 May 2021 07:01  -  17 May 2021 07:00  --------------------------------------------------------  IN:    Dexmedetomidine: 81.7 mL    FentaNYL: 30.4 mL    Norepinephrine: 2.9 mL    Norepinephrine: 33 mL    Pantoprazole: 100 mL    Propofol: 41.2 mL  Total IN: 289.2 mL    OUT:  Indwelling Catheter - Urethral (mL): 610 mL  Total OUT: 610 mL    Total NET: -320.8 mL      17 May 2021 07:01  -  17 May 2021 11:49  --------------------------------------------------------  IN:    Norepinephrine: 2.9 mL    Pantoprazole: 50 mL  Total IN: 52.9 mL    OUT:    Indwelling Catheter - Urethral (mL): 275 mL  Total OUT: 275 mL    Total NET: -222.1 mL    LABS:                        10.6   14.59 )-----------( 125      ( 17 May 2021 10:51 )             31.4     05-17    140  |  111<H>  |  44.0<H>  ----------------------------<  141<H>  4.3   |  22.0  |  0.91    Ca    8.1<L>      17 May 2021 05:19  Phos  3.2     05-17  Mg     1.7     05-17    TPro  4.8<L>  /  Alb  2.9<L>  /  TBili  0.6  /  DBili  x   /  AST  14  /  ALT  8   /  AlkPhos  68  05-17      CARDIAC MARKERS ( 16 May 2021 18:14 )  x     / <0.01 ng/mL / x     / x     / x      CARDIAC MARKERS ( 16 May 2021 14:49 )  x     / <0.01 ng/mL / x     / x     / x          CAPILLARY BLOOD GLUCOSE    PT/INR - ( 16 May 2021 14:49 )   PT: 13.6 sec;   INR: 1.18 ratio    PTT - ( 16 May 2021 14:49 )  PTT:32.2 sec    Physical Examination:  General: Male, laying in bed comfortably on NC 2L/min. No acute distress.    HEENT: Pupils equal, reactive to light. Symmetric. Conjunctiva and sclera clear, anicteric b/l. Moist mucus membranes. Normocephalic, atraumatic.   PULM: Clear to auscultation b/l, vesicular breath sounds b/l. No wheezes, rhonchi, or crackles. On NC 2L/min, SpO2 99%. Respirations unlabored.   CVS: +S1,S2. Regular rate and rhythm, no murmurs, rubs, or gallops.   ABD: Soft, nondistended. Normoactive bowel sounds in all 4 quadrants. Nontender, no rebound, rigidity, or guarding.   EXT: No peripheral edema b/l, no calf tenderness b/l.  SKIN: Warm and well perfused, no rashes noted.  PV: 2+ pedal and radial pulses b/l, no cyanosis.   NEURO: A&Ox3, interactive, no focal deficits. No resting tremor noted.    RADIOLOGY:   CT Abdomen and Pelvis w/ IV Cont (05.16.21 @ 18:05) >  EXAM:  CT ABDOMEN AND PELVIS IC                        PROCEDURE DATE:  05/16/2021    INTERPRETATION:  CLINICAL INFORMATION: GI bleeding. Bloody emesis.  COMPARISON: None.    CONTRAST/COMPLICATIONS:  IV Contrast: Omnipaque 300  94 cc administered   6 cc discarded  Oral Contrast: NONE  Complications: None reported at time of study completion    PROCEDURE:  CT of the Abdomen and Pelvis was performed.  Precontrast, Arterial and Delayed phases were performed.  Sagittal and coronal reformats were performed.    FINDINGS:  LOWER CHEST: Large hiatal hernia.  LIVER: Scattered hepatic cysts and subcentimeter hypodense foci that are too small to characterize.  BILE DUCTS: Normal caliber.  GALLBLADDER: Within normal limits.  SPLEEN: Withinnormal limits.  PANCREAS: Within normal limits.  ADRENALS: Within normal limits.  KIDNEYS/URETERS: 1.7 cm hypodense left upper pole renal mass measuring greater than cyst density. The right kidney is unremarkable. No hydronephrosis.  BLADDER: Within normal limits.  REPRODUCTIVE ORGANS: Prostate within normal limits.  BOWEL: The stomach is distended with heterogeneous material, also extending into the large hiatal hernia with fluid. There is a small 1.3 cm intraluminal hypervascular focus within the hernia sac on the arterial phase (series 6, image 26, series 11, image 57), which changes configuration on the delayed phase, concerning for small active extravasation. Colonic diverticulosis without evidence of diverticulitis. No bowel wall thickening or obstruction. Appendix is not visualized. No evidence of inflammation in the pericecal region.  PERITONEUM: No ascites.  VESSELS: Within normal limits.  RETROPERITONEUM/LYMPH NODES: No lymphadenopathy.  ABDOMINAL WALL: Within normal limits.  BONES: Mild spinal degenerative changes.    IMPRESSION:  Distended stomach with fluid and heterogeneous mixed density, and large hiatal hernia. A 1.3 cm hypervascular focus within the herniated stomach, is suspicious for active extravasation/active GI bleed. Correlate with upper endoscopy.  Indeterminate left renal nodule. Correlate with ultrasound when clinically appropriate.  I discussed the findings with Dr. Hess of the ED on 5/16/2021 at 6:40 PM.  AMEENA NASH MD; Attending Radiologist    Xray Chest 1 View- PORTABLE-Urgent (Xray Chest 1 View- PORTABLE-Urgent .) (05.16.21 @ 21:32) >   EXAM:  XR CHEST PORTABLE URGENT 1V                        PROCEDURE DATE:  05/16/2021    INTERPRETATION:  DATE OF STUDY: 5/16/2021 at 8:52PM  PRIOR:Prior 5/16/21 exam  CLINICAL INDICATION: Intubated. ET tube placement.  TECHNIQUE: portable chest.    FINDINGS:  ET tube tip is 3.7cm above the michaela.  Right IJ central venous catheter tip overlies SVC.  Heart is similar in size.  Plate-screw fixation in lower right chest redemonstrated.  No focal consolidations. No pleural effusion or pneumothorax.    IMPRESSION:  Negative for acute pulmonary process.  SUNNY VALDEZ MD; Attending Radiologist        CRITICAL CARE TIME SPENT: ***  Evaluating/treating patient, reviewing data/labs/imaging, discussing case with multidisciplinary team, discussing plan/goals of care with patient/family. Non-inclusive of procedure time.   72 yo male with pmhx of Parkinson's disease, HTN, s/p aortic valve replacement presented to the ER on 5/16 c/o 2-3 episodes of hematemesis and melena; Pt denies ETOH use. Pt had EGD and colonoscopy performed 2-3 months ago, which was negative for pathology. Pt was admitted to MICU services for upper GI bleed and hemorrhagic shock on 5/16. Pt is s/p 3uPRBC's with improvements in BP, 1 FFP, 1 plts. He was electively intubated on 5/16 for airway protection. EGD was performed in ICU with Dr Griffin on 5/16- EGD indicated polypoid lesion with active bleeding and clots in the gastric cardia. Epi was injected around the bleeding area along with the placement of 6 endoclips to control the bleeding. Pt was subsequently extubated 5/17 without complications.      5/17/21: Pt was seen and examined at the bedside, resting comfortably in bed on NC 2L/min. Pt offering complaints of nausea, no vomiting.Denies chest pain, palpitations, SOB, or abdominal pain. Came off pressors this morning. Having additional melanotic BMS today w/ associated dizziness. Given 1L IVF bolus     PAST MEDICAL & SURGICAL HISTORY:  HTN (hypertension)  S/P aortic valve replacement  Parkinsons disease    Review of Systems:  CONSTITUTIONAL: No fever, chills, or fatigue  EYES: +left eye pain. No visual disturbances or discharge  ENMT:  No difficulty hearing; No throat pain  NECK: No pain or stiffness  RESPIRATORY: No cough, wheezing, chills or hemoptysis; No shortness of breath  CARDIOVASCULAR: No chest pain, palpitations, dizziness, or leg swelling  GASTROINTESTINAL: +melena, nausea. No abdominal or epigastric pain. No vomiting, or hematemesis; No diarrhea or constipation. No hematochezia.  GENITOURINARY: No dysuria or hematuria  NEUROLOGICAL: No headaches, loss of strength, or paresthesias.   SKIN: No rashes, or lesions   MUSCULOSKELETAL: No joint pain or swelling  PSYCHIATRIC: No depression, anxiety, mood swings, or difficulty sleeping    Medications:  carbidopa/levodopa  25/100 1 Tablet(s) Oral four times a day  carbidopa/levodopa CR 50/200 1 Tablet(s) Oral <User Schedule>  pantoprazole Infusion 8 mG/Hr IV Continuous <Continuous>  chlorhexidine 0.12% Liquid 15 milliLiter(s) Oral Mucosa every 12 hours  chlorhexidine 2% Cloths 1 Application(s) Topical <User Schedule>    Mode: CPAP with PS  FiO2: 40  PEEP: 5  PC: 10    ICU Vital Signs Last 24 Hrs  T(C): 36.4 (17 May 2021 11:36), Max: 36.9 (16 May 2021 19:36)  T(F): 97.6 (17 May 2021 11:36), Max: 98.5 (16 May 2021 19:36)  HR: 93 (17 May 2021 11:00) (60 - 101)  BP: 96/72 (17 May 2021 11:00) (65/50 - 151/101)  BP(mean): 81 (17 May 2021 11:00) (55 - 111)  ABP: --  ABP(mean): --  RR: 16 (17 May 2021 11:00) (14 - 35)  SpO2: 100% (17 May 2021 11:00) (78% - 100%)    ABG - ( 17 May 2021 03:40 )  pH, Arterial: 7.34  pH, Blood: x     /  pCO2: 41    /  pO2: 198   / HCO3: 21    / Base Excess: -3.7  /  SaO2: 100       I&O's Detail    16 May 2021 07:01  -  17 May 2021 07:00  --------------------------------------------------------  IN:    Dexmedetomidine: 81.7 mL    FentaNYL: 30.4 mL    Norepinephrine: 2.9 mL    Norepinephrine: 33 mL    Pantoprazole: 100 mL    Propofol: 41.2 mL  Total IN: 289.2 mL    OUT:  Indwelling Catheter - Urethral (mL): 610 mL  Total OUT: 610 mL    Total NET: -320.8 mL      17 May 2021 07:01  -  17 May 2021 11:49  --------------------------------------------------------  IN:    Norepinephrine: 2.9 mL    Pantoprazole: 50 mL  Total IN: 52.9 mL    OUT:    Indwelling Catheter - Urethral (mL): 275 mL  Total OUT: 275 mL    Total NET: -222.1 mL    LABS:                        10.6   14.59 )-----------( 125      ( 17 May 2021 10:51 )             31.4     05-17    140  |  111<H>  |  44.0<H>  ----------------------------<  141<H>  4.3   |  22.0  |  0.91    Ca    8.1<L>      17 May 2021 05:19  Phos  3.2     05-17  Mg     1.7     05-17    TPro  4.8<L>  /  Alb  2.9<L>  /  TBili  0.6  /  DBili  x   /  AST  14  /  ALT  8   /  AlkPhos  68  05-17      CARDIAC MARKERS ( 16 May 2021 18:14 )  x     / <0.01 ng/mL / x     / x     / x      CARDIAC MARKERS ( 16 May 2021 14:49 )  x     / <0.01 ng/mL / x     / x     / x          CAPILLARY BLOOD GLUCOSE    PT/INR - ( 16 May 2021 14:49 )   PT: 13.6 sec;   INR: 1.18 ratio    PTT - ( 16 May 2021 14:49 )  PTT:32.2 sec    Physical Examination:  General: Male, laying in bed comfortably. No acute distress.    HEENT: Pupils equal, reactive to light. Symmetric. Conjunctiva and sclera clear, anicteric b/l. Moist mucus membranes. Normocephalic, atraumatic.   PULM: Clear to auscultation b/l, vesicular breath sounds b/l. No wheezes, rhonchi, or crackles. On NC 2L/min, SpO2 99%. Respirations unlabored.   CVS: +S1,S2. Regular rate and rhythm, no murmurs, rubs, or gallops.   ABD: Soft, nondistended. Normoactive bowel sounds in all 4 quadrants. Nontender, no rebound, rigidity, or guarding.   EXT: No peripheral edema b/l, no calf tenderness b/l.  SKIN: Warm and well perfused, no rashes noted.  PV: 2+ pedal and radial pulses b/l, no cyanosis.   NEURO: A&Ox3, interactive, no focal deficits. No resting tremor noted.    RADIOLOGY:   CT Abdomen and Pelvis w/ IV Cont (05.16.21 @ 18:05) >  EXAM:  CT ABDOMEN AND PELVIS IC                        PROCEDURE DATE:  05/16/2021    INTERPRETATION:  CLINICAL INFORMATION: GI bleeding. Bloody emesis.  COMPARISON: None.    CONTRAST/COMPLICATIONS:  IV Contrast: Omnipaque 300  94 cc administered   6 cc discarded  Oral Contrast: NONE  Complications: None reported at time of study completion    PROCEDURE:  CT of the Abdomen and Pelvis was performed.  Precontrast, Arterial and Delayed phases were performed.  Sagittal and coronal reformats were performed.    FINDINGS:  LOWER CHEST: Large hiatal hernia.  LIVER: Scattered hepatic cysts and subcentimeter hypodense foci that are too small to characterize.  BILE DUCTS: Normal caliber.  GALLBLADDER: Within normal limits.  SPLEEN: Withinnormal limits.  PANCREAS: Within normal limits.  ADRENALS: Within normal limits.  KIDNEYS/URETERS: 1.7 cm hypodense left upper pole renal mass measuring greater than cyst density. The right kidney is unremarkable. No hydronephrosis.  BLADDER: Within normal limits.  REPRODUCTIVE ORGANS: Prostate within normal limits.  BOWEL: The stomach is distended with heterogeneous material, also extending into the large hiatal hernia with fluid. There is a small 1.3 cm intraluminal hypervascular focus within the hernia sac on the arterial phase (series 6, image 26, series 11, image 57), which changes configuration on the delayed phase, concerning for small active extravasation. Colonic diverticulosis without evidence of diverticulitis. No bowel wall thickening or obstruction. Appendix is not visualized. No evidence of inflammation in the pericecal region.  PERITONEUM: No ascites.  VESSELS: Within normal limits.  RETROPERITONEUM/LYMPH NODES: No lymphadenopathy.  ABDOMINAL WALL: Within normal limits.  BONES: Mild spinal degenerative changes.    IMPRESSION:  Distended stomach with fluid and heterogeneous mixed density, and large hiatal hernia. A 1.3 cm hypervascular focus within the herniated stomach, is suspicious for active extravasation/active GI bleed. Correlate with upper endoscopy.  Indeterminate left renal nodule. Correlate with ultrasound when clinically appropriate.  I discussed the findings with Dr. Hess of the ED on 5/16/2021 at 6:40 PM.  AMEENA NASH MD; Attending Radiologist    Xray Chest 1 View- PORTABLE-Urgent (Xray Chest 1 View- PORTABLE-Urgent .) (05.16.21 @ 21:32) >   EXAM:  XR CHEST PORTABLE URGENT 1V                        PROCEDURE DATE:  05/16/2021    INTERPRETATION:  DATE OF STUDY: 5/16/2021 at 8:52PM  PRIOR:Prior 5/16/21 exam  CLINICAL INDICATION: Intubated. ET tube placement.  TECHNIQUE: portable chest.    FINDINGS:  ET tube tip is 3.7cm above the michaela.  Right IJ central venous catheter tip overlies SVC.  Heart is similar in size.  Plate-screw fixation in lower right chest redemonstrated.  No focal consolidations. No pleural effusion or pneumothorax.    IMPRESSION:  Negative for acute pulmonary process.  SUNNY VALDEZ MD; Attending Radiologist        CRITICAL CARE TIME SPENT: ***  Evaluating/treating patient, reviewing data/labs/imaging, discussing case with multidisciplinary team, discussing plan/goals of care with patient/family. Non-inclusive of procedure time.

## 2021-05-18 DIAGNOSIS — K92.2 GASTROINTESTINAL HEMORRHAGE, UNSPECIFIED: ICD-10-CM

## 2021-05-18 LAB
ANION GAP SERPL CALC-SCNC: 11 MMOL/L — SIGNIFICANT CHANGE UP (ref 5–17)
BUN SERPL-MCNC: 30 MG/DL — HIGH (ref 8–20)
CALCIUM SERPL-MCNC: 7.4 MG/DL — LOW (ref 8.6–10.2)
CHLORIDE SERPL-SCNC: 110 MMOL/L — HIGH (ref 98–107)
CO2 SERPL-SCNC: 22 MMOL/L — SIGNIFICANT CHANGE UP (ref 22–29)
CREAT SERPL-MCNC: 0.88 MG/DL — SIGNIFICANT CHANGE UP (ref 0.5–1.3)
GLUCOSE SERPL-MCNC: 76 MG/DL — SIGNIFICANT CHANGE UP (ref 70–99)
HCT VFR BLD CALC: 28.1 % — LOW (ref 39–50)
HGB BLD-MCNC: 9.8 G/DL — LOW (ref 13–17)
MAGNESIUM SERPL-MCNC: 1.6 MG/DL — SIGNIFICANT CHANGE UP (ref 1.6–2.6)
MCHC RBC-ENTMCNC: 32.3 PG — SIGNIFICANT CHANGE UP (ref 27–34)
MCHC RBC-ENTMCNC: 34.9 GM/DL — SIGNIFICANT CHANGE UP (ref 32–36)
MCV RBC AUTO: 92.7 FL — SIGNIFICANT CHANGE UP (ref 80–100)
PHOSPHATE SERPL-MCNC: 2.2 MG/DL — LOW (ref 2.4–4.7)
PLATELET # BLD AUTO: 118 K/UL — LOW (ref 150–400)
POTASSIUM SERPL-MCNC: 3.5 MMOL/L — SIGNIFICANT CHANGE UP (ref 3.5–5.3)
POTASSIUM SERPL-SCNC: 3.5 MMOL/L — SIGNIFICANT CHANGE UP (ref 3.5–5.3)
RBC # BLD: 3.03 M/UL — LOW (ref 4.2–5.8)
RBC # FLD: 15.4 % — HIGH (ref 10.3–14.5)
SODIUM SERPL-SCNC: 143 MMOL/L — SIGNIFICANT CHANGE UP (ref 135–145)
WBC # BLD: 12.08 K/UL — HIGH (ref 3.8–10.5)
WBC # FLD AUTO: 12.08 K/UL — HIGH (ref 3.8–10.5)

## 2021-05-18 PROCEDURE — 99233 SBSQ HOSP IP/OBS HIGH 50: CPT

## 2021-05-18 PROCEDURE — 12345: CPT | Mod: NC

## 2021-05-18 PROCEDURE — 99232 SBSQ HOSP IP/OBS MODERATE 35: CPT

## 2021-05-18 RX ORDER — CARBIDOPA AND LEVODOPA 25; 100 MG/1; MG/1
1 TABLET ORAL
Refills: 0 | Status: DISCONTINUED | OUTPATIENT
Start: 2021-05-18 | End: 2021-05-20

## 2021-05-18 RX ORDER — LIDOCAINE 4 G/100G
1 CREAM TOPICAL EVERY 24 HOURS
Refills: 0 | Status: DISCONTINUED | OUTPATIENT
Start: 2021-05-18 | End: 2021-05-20

## 2021-05-18 RX ORDER — AMANTADINE HCL 100 MG
100 CAPSULE ORAL
Refills: 0 | Status: DISCONTINUED | OUTPATIENT
Start: 2021-05-18 | End: 2021-05-20

## 2021-05-18 RX ORDER — RASAGILINE 0.5 MG/1
0.5 TABLET ORAL DAILY
Refills: 0 | Status: DISCONTINUED | OUTPATIENT
Start: 2021-05-18 | End: 2021-05-20

## 2021-05-18 RX ORDER — CARBIDOPA AND LEVODOPA 25; 100 MG/1; MG/1
1 TABLET ORAL
Refills: 0 | Status: DISCONTINUED | OUTPATIENT
Start: 2021-05-18 | End: 2021-05-18

## 2021-05-18 RX ORDER — ONDANSETRON 8 MG/1
4 TABLET, FILM COATED ORAL EVERY 6 HOURS
Refills: 0 | Status: DISCONTINUED | OUTPATIENT
Start: 2021-05-18 | End: 2021-05-20

## 2021-05-18 RX ADMIN — CARBIDOPA AND LEVODOPA 1 TABLET(S): 25; 100 TABLET ORAL at 16:53

## 2021-05-18 RX ADMIN — MEN-PHOR 1 APPLICATION(S): .5; .5 LOTION TOPICAL at 00:33

## 2021-05-18 RX ADMIN — LIDOCAINE 1 PATCH: 4 CREAM TOPICAL at 05:26

## 2021-05-18 RX ADMIN — CARBIDOPA AND LEVODOPA 1 TABLET(S): 25; 100 TABLET ORAL at 21:21

## 2021-05-18 RX ADMIN — CHLORHEXIDINE GLUCONATE 1 APPLICATION(S): 213 SOLUTION TOPICAL at 05:25

## 2021-05-18 RX ADMIN — Medication 100 MILLIGRAM(S): at 16:53

## 2021-05-18 RX ADMIN — ONDANSETRON 4 MILLIGRAM(S): 8 TABLET, FILM COATED ORAL at 04:42

## 2021-05-18 NOTE — PROGRESS NOTE ADULT - PROBLEM SELECTOR PLAN 1
Secondary to massive esophageal bleed, s/p EGD with hemoclipping and epi on 05/16  PPI drip for a total of 72 hrs.  Please start patient on Clear liquids and will advance as tolerated.   Continue sucralfate but be mindful this could impair parkinson's disease medication absorption.   Continue to monitor CBC daily.   Will continue to monitor. Secondary to massive  gastric bleed, s/p EGD with hemoclipping and epi on 05/16  PPI drip for a total of 72 hrs.  Please start patient on Clear liquids and will advance as tolerated.   Continue sucralfate but be mindful this could impair parkinson's disease medication absorption.   Continue to monitor CBC daily.   Will continue to monitor.

## 2021-05-18 NOTE — CHART NOTE - NSCHARTNOTEFT_GEN_A_CORE
Called by RN for clarification of orders.  Pt been ordered Sinemet 25/250 four times daily; 50/200 @HS.  Asked by RN to clarify when to give 2 different doses.  Reviewed chart, as per MD, home meds were ordered for Parkinson's.  AS per outpt med record, pt takes Sinemet 25/100 four times; 50/200@[facility].  Confirmed with pt; d/c Sinemet 25/250, ordered 25/100 dosage.  Monitor and escalate prn.

## 2021-05-18 NOTE — PROGRESS NOTE ADULT - SUBJECTIVE AND OBJECTIVE BOX
HOSPITALIST PROGRESS NOTE    PAOLO JUSTIN  [id number]  71yMale    Patient is a 71y old  Male who presents with a chief complaint of GIB (18 May 2021 01:16)      SUBJECTIVE:   Chart reviewed since admission.  Patient seen and examined at bedside for UGIB, ABLA and hemorrhagic shock      OBJECTIVE:  Vital Signs Last 24 Hrs  T(C): 36.9 (18 May 2021 08:00), Max: 37.4 (17 May 2021 20:35)  T(F): 98.4 (18 May 2021 08:00), Max: 99.4 (17 May 2021 20:35)  HR: 107 (18 May 2021 10:00) (81 - 107)  BP: 135/65 (18 May 2021 10:00) (101/65 - 146/71)  BP(mean): 88 (18 May 2021 10:00) (68 - 97)  RR: 26 (18 May 2021 10:00) (12 - 26)  SpO2: 100% (18 May 2021 10:00) (95% - 100%)    PHYSICAL EXAMINATION  General:   HEENT:    NECK:    CVS:   RESP:    GI:    :   MSK:    CNS:    INTEG:    PSYCH:      MONITOR:  CAPILLARY BLOOD GLUCOSE            I&O's Summary    17 May 2021 07:01  -  18 May 2021 07:00  --------------------------------------------------------  IN: 1232.9 mL / OUT: 1775 mL / NET: -542.1 mL    18 May 2021 07:01  -  18 May 2021 11:54  --------------------------------------------------------  IN: 40 mL / OUT: 0 mL / NET: 40 mL                            9.8    12.08 )-----------( 118      ( 18 May 2021 05:19 )             28.1     PT/INR - ( 16 May 2021 14:49 )   PT: 13.6 sec;   INR: 1.18 ratio         PTT - ( 16 May 2021 14:49 )  PTT:32.2 sec  05-18    143  |  110<H>  |  30.0<H>  ----------------------------<  76  3.5   |  22.0  |  0.88    Ca    7.4<L>      18 May 2021 05:19  Phos  2.2     05-18  Mg     1.6     05-18    TPro  4.8<L>  /  Alb  2.9<L>  /  TBili  0.6  /  DBili  x   /  AST  14  /  ALT  8   /  AlkPhos  68  05-17    CARDIAC MARKERS ( 16 May 2021 18:14 )  x     / <0.01 ng/mL / x     / x     / x      CARDIAC MARKERS ( 16 May 2021 14:49 )  x     / <0.01 ng/mL / x     / x     / x              Culture:    TTE:    RADIOLOGY        MEDICATIONS  (STANDING):  amantadine 100 milliGRAM(s) Oral two times a day  carbidopa/levodopa  25/250 1 Tablet(s) Oral four times a day  carbidopa/levodopa CR 50/200 1 Tablet(s) Oral <User Schedule>  chlorhexidine 2% Cloths 1 Application(s) Topical <User Schedule>  pantoprazole Infusion 8 mG/Hr (10 mL/Hr) IV Continuous <Continuous>  rasagiline Tablet 0.5 milliGRAM(s) Oral daily      MEDICATIONS  (PRN):  camphor 0.5%/menthol 0.5% Topical Lotion 1 Application(s) Topical four times a day PRN back pain  lidocaine   Patch 1 Patch Transdermal every 24 hours PRN Low Back Pain  ondansetron Injectable 4 milliGRAM(s) IV Push every 6 hours PRN Nausea and/or Vomiting   Assessment/Plan:    No problem-specific Assessment & Plan notes found for this encounter  Diagnoses and all orders for this visit:    Uncontrolled type 2 diabetes mellitus without complication, without long-term current use of insulin (HCC)  -     Dulaglutide 1 5 MG/0 5ML SOPN; Inject 0 5 mL (1 5 mg total) under the skin once a week for 90 days  -     Comprehensive metabolic panel; Future  -     Hemoglobin A1c; Future  -     Lipid Panel with Direct LDL reflex; Future  -     Uric acid; Future    Essential hypertension  -     Comprehensive metabolic panel; Future  -     Hemoglobin A1c; Future  -     Lipid Panel with Direct LDL reflex; Future  -     Uric acid; Future    Chronic idiopathic gout of ankle, unspecified laterality  -     Comprehensive metabolic panel; Future  -     Hemoglobin A1c; Future  -     Lipid Panel with Direct LDL reflex; Future  -     Uric acid; Future    Essential hypertriglyceridemia  -     Comprehensive metabolic panel; Future  -     Hemoglobin A1c; Future  -     Lipid Panel with Direct LDL reflex; Future  -     Uric acid; Future    Other orders  -     Discontinue: TRULICITY 7 11 PW/8 0TS SOPN; Inject 0 75 mg under the skin every 7 days          Subjective:      Patient ID: Zaida Sams is a 41 y o  male  Pt started Trulicity 3 weeks ago  His sugars are runnung 190-240 (were 300s)  No injection site problems  The following portions of the patient's history were reviewed and updated as appropriate:   He  has a past medical history of CPAP (continuous positive airway pressure) dependence; Diabetes ([address] ); HTN (hypertension); Kidney stone; Morbid obesity with BMI of 40 0-44 9, adult ([address] ); and SANCHO on CPAP  He  does not have any pertinent problems on file  He  has a past surgical history that includes pr cysto/uretero w/lithotripsy &indwell stent insrt (Right, 5/1/2017) and pr fragment kidney stone/ eswl (Left, 6/19/2017)    His family history includes Bone cancer in his father; Cancer in his family; Hypertension in his family; Prostate cancer in his maternal grandfather  He  reports that he has never smoked  He does not have any smokeless tobacco history on file  He reports that he does not drink alcohol or use drugs    Current Outpatient Prescriptions   Medication Sig Dispense Refill    allopurinol (ZYLOPRIM) 300 mg tablet Take 300 mg by mouth daily      allopurinol (ZYLOPRIM) 300 mg tablet Take 1 tablet by mouth daily      atorvastatin (LIPITOR) 40 mg tablet Take 40 mg by mouth daily      atorvastatin (LIPITOR) 40 mg tablet Take 2 tablets by mouth daily      Blood Glucose Monitoring Suppl (ONE TOUCH ULTRA 2) w/Device KIT 1 kit by Does not apply route daily      cyclobenzaprine (FLEXERIL) 10 mg tablet Take 1 tablet by mouth 3 (three) times a day      Dulaglutide 1 5 MG/0 5ML SOPN Inject 0 5 mL (1 5 mg total) under the skin once a week for 90 days 12 pen 3    glucose blood test strip by In Vitro route Twice daily      ibuprofen (MOTRIN) 600 mg tablet Take 1 tablet by mouth every 8 (eight) hours      Icosapent Ethyl (VASCEPA) 1 g CAPS Take 2 capsules by mouth every 12 (twelve) hours      indomethacin (INDOCIN SR) 75 mg CR capsule Take 75 mg by mouth as needed      indomethacin (INDOCIN SR) 75 mg CR capsule Take 75 mg by mouth daily as needed for pain relief      metFORMIN (FORTAMET) 1000 MG (OSM) 24 hr tablet Take 1 tablet by mouth 2 (two) times a day      metFORMIN (GLUMETZA) 1000 MG (MOD) 24 hr tablet Take 1,000 mg by mouth 2 (two) times a day with meals      Omega-3 Fatty Acids (FISH OIL DOUBLE STRENGTH) 1200 MG CAPS Take 1 capsule by mouth 2 (two) times a day      ONETOUCH DELICA LANCETS 92B MISC 1 Units by Does not apply route daily      valsartan-hydrochlorothiazide (DIOVAN-HCT) 320-25 MG per tablet Take 1 tablet by mouth daily      valsartan-hydrochlorothiazide (DIOVAN-HCT) 320-25 MG per tablet Take 1 tablet by mouth daily       No current facility-administered medications for this visit  Current Outpatient Prescriptions on File Prior to Visit   Medication Sig    allopurinol (ZYLOPRIM) 300 mg tablet Take 300 mg by mouth daily    allopurinol (ZYLOPRIM) 300 mg tablet Take 1 tablet by mouth daily    atorvastatin (LIPITOR) 40 mg tablet Take 40 mg by mouth daily    atorvastatin (LIPITOR) 40 mg tablet Take 2 tablets by mouth daily    Blood Glucose Monitoring Suppl (ONE TOUCH ULTRA 2) w/Device KIT 1 kit by Does not apply route daily    cyclobenzaprine (FLEXERIL) 10 mg tablet Take 1 tablet by mouth 3 (three) times a day    glucose blood test strip by In Vitro route Twice daily    ibuprofen (MOTRIN) 600 mg tablet Take 1 tablet by mouth every 8 (eight) hours    Icosapent Ethyl (VASCEPA) 1 g CAPS Take 2 capsules by mouth every 12 (twelve) hours    indomethacin (INDOCIN SR) 75 mg CR capsule Take 75 mg by mouth as needed    indomethacin (INDOCIN SR) 75 mg CR capsule Take 75 mg by mouth daily as needed for pain relief    metFORMIN (FORTAMET) 1000 MG (OSM) 24 hr tablet Take 1 tablet by mouth 2 (two) times a day    metFORMIN (GLUMETZA) 1000 MG (MOD) 24 hr tablet Take 1,000 mg by mouth 2 (two) times a day with meals    Omega-3 Fatty Acids (FISH OIL DOUBLE STRENGTH) 1200 MG CAPS Take 1 capsule by mouth 2 (two) times a day    ONETOUCH DELICA LANCETS 45Z MISC 1 Units by Does not apply route daily    valsartan-hydrochlorothiazide (DIOVAN-HCT) 320-25 MG per tablet Take 1 tablet by mouth daily    valsartan-hydrochlorothiazide (DIOVAN-HCT) 320-25 MG per tablet Take 1 tablet by mouth daily     No current facility-administered medications on file prior to visit  He has No Known Allergies       Review of Systems   Constitutional: Negative for fatigue and fever  HENT: Negative for congestion  Eyes: Negative for visual disturbance  Respiratory: Negative for cough and shortness of breath  Cardiovascular: Negative for chest pain, palpitations and leg swelling  Gastrointestinal: Negative for abdominal distention and abdominal pain  Endocrine: Negative for cold intolerance, polydipsia and polyuria  Genitourinary: Negative for difficulty urinating  Musculoskeletal: Negative  Negative for back pain and joint swelling  Skin: Negative for color change and rash  Allergic/Immunologic: Negative for immunocompromised state  Neurological: Negative  Negative for dizziness, numbness and headaches  Hematological: Negative for adenopathy  Psychiatric/Behavioral: Negative for behavioral problems and sleep disturbance  Objective:    Vitals:    02/13/18 1658   BP: 130/82   Pulse: 90   Resp: 20   Temp: (!) 97 4 °F (36 3 °C)   SpO2: 98%        Physical Exam   Constitutional: He is oriented to person, place, and time  He appears well-developed and well-nourished  HENT:   Head: Normocephalic and atraumatic  Mouth/Throat: Oropharynx is clear and moist    Eyes: Conjunctivae and EOM are normal  Pupils are equal, round, and reactive to light  Neck: Normal range of motion  Cardiovascular: Normal rate, regular rhythm and normal heart sounds  Exam reveals no gallop and no friction rub  No murmur heard  Pulmonary/Chest: Effort normal and breath sounds normal  No respiratory distress  Abdominal: Soft  There is no tenderness  Musculoskeletal: Normal range of motion  Lymphadenopathy:     He has no cervical adenopathy  Neurological: He is alert and oriented to person, place, and time  Skin: Skin is warm and dry  Psychiatric: He has a normal mood and affect  His behavior is normal  Judgment and thought content normal    Nursing note and vitals reviewed    HOSPITALIST PROGRESS NOTE    PAOLO JUSTIN  [id number]  71yMale    Patient is a 71y old  Male who presents with a chief complaint of GIB (18 May 2021 01:16)      SUBJECTIVE:   Chart reviewed since admission.  Patient seen and examined at bedside for UGIB, ABLA and hemorrhagic shock.  Denies any nausea, vomiting, abdominal pain.  Denies any BM since after admission      OBJECTIVE:  Vital Signs Last 24 Hrs  T(C): 36.9 (18 May 2021 08:00), Max: 37.4 (17 May 2021 20:35)  T(F): 98.4 (18 May 2021 08:00), Max: 99.4 (17 May 2021 20:35)  HR: 107 (18 May 2021 10:00) (81 - 107)  BP: 135/65 (18 May 2021 10:00) (101/65 - 146/71)  BP(mean): 88 (18 May 2021 10:00) (68 - 97)  RR: 26 (18 May 2021 10:00) (12 - 26)  SpO2: 100% (18 May 2021 10:00) (95% - 100%)    PHYSICAL EXAMINATION  General: Elderly male lying in bed, NAD  HEENT:  EOMI  NECK:  supple  CVS: regular rate and rhythm S1 S2  RESP:  CTAB  GI:  Soft nondistended nontender BS+  : No suprapubic tenderness  MSK:  FROM  CNS:  slow to move  INTEG:  warm dry skin  PSYCH:  fair mood      MONITOR:  CAPILLARY BLOOD GLUCOSE            I&O's Summary    17 May 2021 07:01  -  18 May 2021 07:00  --------------------------------------------------------  IN: 1232.9 mL / OUT: 1775 mL / NET: -542.1 mL    18 May 2021 07:01  -  18 May 2021 11:54  --------------------------------------------------------  IN: 40 mL / OUT: 0 mL / NET: 40 mL                            9.8    12.08 )-----------( 118      ( 18 May 2021 05:19 )             28.1     PT/INR - ( 16 May 2021 14:49 )   PT: 13.6 sec;   INR: 1.18 ratio         PTT - ( 16 May 2021 14:49 )  PTT:32.2 sec  05-18    143  |  110<H>  |  30.0<H>  ----------------------------<  76  3.5   |  22.0  |  0.88    Ca    7.4<L>      18 May 2021 05:19  Phos  2.2     05-18  Mg     1.6     05-18    TPro  4.8<L>  /  Alb  2.9<L>  /  TBili  0.6  /  DBili  x   /  AST  14  /  ALT  8   /  AlkPhos  68  05-17    CARDIAC MARKERS ( 16 May 2021 18:14 )  x     / <0.01 ng/mL / x     / x     / x      CARDIAC MARKERS ( 16 May 2021 14:49 )  x     / <0.01 ng/mL / x     / x     / x              Culture:    TTE:    RADIOLOGY        MEDICATIONS  (STANDING):  amantadine 100 milliGRAM(s) Oral two times a day  carbidopa/levodopa  25/250 1 Tablet(s) Oral four times a day  carbidopa/levodopa CR 50/200 1 Tablet(s) Oral <User Schedule>  chlorhexidine 2% Cloths 1 Application(s) Topical <User Schedule>  pantoprazole Infusion 8 mG/Hr (10 mL/Hr) IV Continuous <Continuous>  rasagiline Tablet 0.5 milliGRAM(s) Oral daily      MEDICATIONS  (PRN):  camphor 0.5%/menthol 0.5% Topical Lotion 1 Application(s) Topical four times a day PRN back pain  lidocaine   Patch 1 Patch Transdermal every 24 hours PRN Low Back Pain  ondansetron Injectable 4 milliGRAM(s) IV Push every 6 hours PRN Nausea and/or Vomiting

## 2021-05-18 NOTE — PROGRESS NOTE ADULT - ATTENDING COMMENTS
Patient seen and examined at bedside.  Patient with gastric bleeding status post endoscopic hemostasis.  On PPI infusion.  CBC stable.  Start clear liquid diet and advance diet as tolerated.  Needs repeat EGD in 3 months.

## 2021-05-18 NOTE — PROGRESS NOTE ADULT - SUBJECTIVE AND OBJECTIVE BOX
Chief Complaint: This is a 71y old man patient being seen in follow-up consultation for GIB.    HPI / 24H events:  Patient seeing and evaluated at bedside, reporting no active bleeding, no abdominal pain, tolerating oral intake. S/p EGD with hemoclipping and epi on 05/16. Now downgraded from MICU to regular floor. Denies nausea, vomiting, abdominal pain, chest pain, shortness of breath, hematemesis, hematochezia, melena.  ROS: A 14-point review of systems was reviewed and was otherwise negative save what was reported in the HPI.    PAST MEDICAL/SURGICAL HISTORY:  HTN (hypertension)    S/P aortic valve replacement    Parkinsons disease    No significant past surgical history      MEDICATIONS  (STANDING):  amantadine 100 milliGRAM(s) Oral two times a day  carbidopa/levodopa  25/250 1 Tablet(s) Oral four times a day  carbidopa/levodopa CR 50/200 1 Tablet(s) Oral <User Schedule>  chlorhexidine 2% Cloths 1 Application(s) Topical <User Schedule>  pantoprazole Infusion 8 mG/Hr (10 mL/Hr) IV Continuous <Continuous>  rasagiline Tablet 0.5 milliGRAM(s) Oral daily    MEDICATIONS  (PRN):  camphor 0.5%/menthol 0.5% Topical Lotion 1 Application(s) Topical four times a day PRN back pain  lidocaine   Patch 1 Patch Transdermal every 24 hours PRN Low Back Pain  ondansetron Injectable 4 milliGRAM(s) IV Push every 6 hours PRN Nausea and/or Vomiting    No Known Allergies    T(C): 36.7 (05-18-21 @ 13:00), Max: 37.4 (05-17-21 @ 20:35)  HR: 84 (05-18-21 @ 13:00) (81 - 107)  BP: 122/80 (05-18-21 @ 13:00) (102/56 - 146/71)  RR: 20 (05-18-21 @ 13:00) (12 - 26)  SpO2: 98% (05-18-21 @ 13:00) (95% - 100%)    I&O's Summary    17 May 2021 07:01  -  18 May 2021 07:00  --------------------------------------------------------  IN: 1232.9 mL / OUT: 1775 mL / NET: -542.1 mL    18 May 2021 07:01  -  18 May 2021 14:38  --------------------------------------------------------  IN: 60 mL / OUT: 400 mL / NET: -340 mL      PHYSICAL EXAM:  Constitutional: Well-developed, well-nourished, in no apparent distress  Eyes: Sclerae anicteric, conjunctivae normal  ENMT: Mucus membranes moist, no oropharyngeal thrush noted  Respiratory: Breathing nonlabored; clear to auscultation  Cardiovascular: Regular rate and rhythm  Gastrointestinal: Soft, nontender, nondistended, normoactive bowel sounds.  Extremities: No clubbing, cyanosis or edema  Neurological: Alert and oriented to person, place and time; no asterixis  Skin: No jaundice  Musculoskeletal: No significant peripheral atrophy  Psychiatric: Affect and mood appropriate      ABG - ( 17 May 2021 03:40 )  pH, Arterial: 7.34  pH, Blood: x     /  pCO2: 41    /  pO2: 198   / HCO3: 21    / Base Excess: -3.7  /  SaO2: 100                          9.8    12.08 )-----------( 118      ( 05-18 @ 05:19 )             28.1                10.2   15.73 )-----------( 129      ( 05-17 @ 16:05 )             29.0                10.6   14.59 )-----------( 125      ( 05-17 @ 10:51 )             31.4                11.4   15.86 )-----------( 149      ( 05-17 @ 05:19 )             33.8                11.8   15.95 )-----------( 184      ( 05-16 @ 23:41 )             35.2                12.4   12.38 )-----------( 166      ( 05-16 @ 16:59 )             36.8                13.9   13.73 )-----------( 167      ( 05-16 @ 14:49 )             41.3       05-18    143  |  110<H>  |  30.0<H>  ----------------------------<  76  3.5   |  22.0  |  0.88    Ca    7.4<L>      18 May 2021 05:19  Phos  2.2     05-18  Mg     1.6     05-18    TPro  4.8<L>  /  Alb  2.9<L>  /  TBili  0.6  /  DBili  x   /  AST  14  /  ALT  8   /  AlkPhos  68  05-17    LIVER FUNCTIONS - ( 17 May 2021 05:19 )  Alb: 2.9 g/dL / Pro: 4.8 g/dL / ALK PHOS: 68 U/L / ALT: 8 U/L / AST: 14 U/L / GGT: x               PT/INR - ( 16 May 2021 14:49 )   PT: 13.6 sec;   INR: 1.18 ratio         PTT - ( 16 May 2021 14:49 )  PTT:32.2 sec    IMAGING: I personally reviewed the CT of abdomen and pelvis, and I agree with the radiologist's interpretation as described below:  FINDINGS:  LOWER CHEST: Large hiatal hernia.    LIVER: Scattered hepatic cysts and subcentimeter hypodense foci that are too small to characterize.  BILE DUCTS: Normal caliber.  GALLBLADDER: Within normal limits.  SPLEEN: Withinnormal limits.  PANCREAS: Within normal limits.  ADRENALS: Within normal limits.  KIDNEYS/URETERS: 1.7 cm hypodense left upper pole renal mass measuring greater than cyst density. The right kidney is unremarkable. No hydronephrosis.    BLADDER: Within normal limits.  REPRODUCTIVE ORGANS: Prostate within normal limits.    BOWEL: The stomach is distended with heterogeneous material, also extending into the large hiatal hernia with fluid. There is a small 1.3 cm intraluminal hypervascular focus within the hernia sac on the arterial phase (series 6, image 26, series 11, image 57), which changes configuration on the delayed phase, concerning for small active extravasation. Colonic diverticulosis without evidence of diverticulitis. No bowel wall thickening or obstruction. Appendix is not visualized. No evidence of inflammation in the pericecal region.  PERITONEUM: No ascites.  VESSELS: Within normal limits.  RETROPERITONEUM/LYMPH NODES: No lymphadenopathy.  ABDOMINAL WALL: Within normal limits.  BONES: Mild spinal degenerative changes.    IMPRESSION:  Distended stomach with fluid and heterogeneous mixed density, and large hiatal hernia. A 1.3 cm hypervascular focus within the herniated stomach, is suspicious for active extravasation/active GI bleed. Correlate with upper endoscopy.    Indeterminate left renal nodule. Correlate with ultrasound when clinically appropriate.

## 2021-05-18 NOTE — PHYSICAL THERAPY INITIAL EVALUATION ADULT - PERTINENT HX OF CURRENT PROBLEM, REHAB EVAL
Pt is a 72 y/o male who presented with UGIB s/p EGD for hemorrhage control. (+) hemorrhagic shock. Pt has hx/o Parkinson's.

## 2021-05-18 NOTE — PROGRESS NOTE ADULT - ASSESSMENT
71 year old male with PMH HTN, Parkinson's disease and AVR presented with hematemesis and hematochezia/melena. CTA with large hiatal hernia and active bleeding noted in the stomach region. Hypotensive due to hemorrhagic shock and started on IV pressors. Received PRBCx1, Plateletsx1, FFPx1. 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  He was extubated and weaned of pressor and downgraded from MICU the following day.    Upper GI bleed with Acute blood loss anemia - s/p EGD, clipping, multiple transfusion. Hemodynamically stable with decrease in Hgb  - Maintain NPO with sips and medications  - Continue PPI infusion x 72 hours as per GI recommendations  - Monitor Hgb, transfuse PRBC as needed  - GI follow up    Hemorrhagic shock - resolved    Parkinson's disease  - Resume home medications    Thrombocytopenia - mild interval decrease  - Monitor counts    Dyslipidemia  - Resume Statin once on PO diet    Prophylactic measure  - VCD for VTEp  - Mobilize with PT      Disposition - Awaiting completion Protonix infusion, stabilization of anemia

## 2021-05-18 NOTE — PHYSICAL THERAPY INITIAL EVALUATION ADULT - ADDITIONAL COMMENTS
Pt lives in a co-op with 3 ADIS with b/l rail and a flight of stairs to bedrooms. Lives with supportive girlfriend, Anayeli. no DME.

## 2021-05-18 NOTE — PROGRESS NOTE ADULT - SUBJECTIVE AND OBJECTIVE BOX
Pt. seen and examined, feels better , no active bleed, no abd. pain, no n/v/d. no cp, no sob. pt. was admitted to MICU for  GI bleed, now stable . EGD was performed in ICU with Dr Griffin on 5/16- EGD indicated polypoid lesion with active bleeding and clots in the gastric cardia. Epi was injected around the bleeding area along with the placement of 6 endoclips to control the bleeding. Pt was subsequently extubated 5/17 without complications  Hospital course :  72 yo male with pmhx of Parkinson's disease, HTN, s/p aortic valve replacement presented to the ER on 5/16 c/o 2-3 episodes of hematemesis and melena; Pt denies ETOH use. Pt had EGD and colonoscopy performed 2-3 months ago, which was negative for pathology. Pt was admitted to MICU services for upper GI bleed and hemorrhagic shock on 5/16. Pt is s/p 3uPRBC's with improvements in BP, 1 FFP, 1 plts. He was electively intubated on 5/16 for airway protection. EGD was performed in ICU with Dr Griffin on 5/16- EGD indicated polypoid lesion with active bleeding and clots in the gastric cardia. Epi was injected around the bleeding area along with the placement of 6 endoclips to control the bleeding. Pt was subsequently extubated 5/17 without complications.     P/E   Vital Signs Last 24 Hrs  T(C): 37.4 (17 May 2021 20:35), Max: 37.4 (17 May 2021 20:35)  T(F): 99.4 (17 May 2021 20:35), Max: 99.4 (17 May 2021 20:35)  HR: 90 (18 May 2021 00:00) (60 - 96)  BP: 108/65 (18 May 2021 00:00) (70/56 - 146/71)  BP(mean): 78 (18 May 2021 00:00) (55 - 94)  RR: 21 (18 May 2021 00:00) (12 - 35)  SpO2: 96% (18 May 2021 00:00) (96% - 100%)    General: Well developed. well nourished. not in distress  HEENT: AT, NC. PERRL. intact EOM. no throat erythema or exudate.   Neck: supple. no JVD. no palpable lymph nodes  Chest: CTA bilaterally  Heart: normal S1,S2. RRR. no heart murmur.  Abdomen: soft. non-tender. non-distended. + BS. no hernia or palpable masses.  Genital: not examined  Ext: no C/C/E. no calf tenderness.  Neuro: AAO x3. no focal weakness. no speech disorder  Skin: no rash    MEDICATIONS  (STANDING):  chlorhexidine 2% Cloths 1 Application(s) Topical <User Schedule>  pantoprazole Infusion 8 mG/Hr (10 mL/Hr) IV Continuous <Continuous>    MEDICATIONS  (PRN):  camphor 0.5%/menthol 0.5% Topical Lotion 1 Application(s) Topical four times a day PRN back pain    LABS :                            10.2   15.73 )-----------( 129      ( 17 May 2021 16:05 )             29.0     05-17    140  |  111<H>  |  44.0<H>  ----------------------------<  141<H>  4.3   |  22.0  |  0.91    Ca    8.1<L>      17 May 2021 05:19  Phos  3.2     05-17  Mg     1.7     05-17    TPro  4.8<L>  /  Alb  2.9<L>  /  TBili  0.6  /  DBili  x   /  AST  14  /  ALT  8   /  AlkPhos  68  05-17    CARDIAC MARKERS ( 16 May 2021 18:14 )  x     / <0.01 ng/mL / x     / x     / x      CARDIAC MARKERS ( 16 May 2021 14:49 )  x     / <0.01 ng/mL / x     / x     / x              PT/INR - ( 16 May 2021 14:49 )   PT: 13.6 sec;   INR: 1.18 ratio         PTT - ( 16 May 2021 14:49 )  PTT:32.2 sec   Pt. seen and examined, feels better , no active bleed, no abd. pain, no n/v/d. no cp, no sob. pt. was admitted to MICU for  GI bleed, now stable . EGD was performed in ICU with Dr Griffin on 5/16- EGD indicated polypoid lesion with active bleeding and clots in the gastric cardia. Epi was injected around the bleeding area along with the placement of 6 endoclips to control the bleeding. Pt was subsequently extubated 5/17 without complications  Hospital course :  70 yo male with pmhx of Parkinson's disease, HTN, s/p aortic valve replacement presented to the ER on 5/16 c/o 2-3 episodes of hematemesis and melena; Pt denies ETOH use. Pt had EGD and colonoscopy performed 2-3 months ago, which was negative for pathology. Pt was admitted to MICU services for upper GI bleed and hemorrhagic shock on 5/16. Pt is s/p 3uPRBC's with improvements in BP, 1 FFP, 1 plts. He was electively intubated on 5/16 for airway protection. EGD was performed in ICU with Dr Griffin on 5/16- EGD indicated polypoid lesion with active bleeding and clots in the gastric cardia. Epi was injected around the bleeding area along with the placement of 6 endoclips to control the bleeding. Pt was subsequently extubated 5/17 without complications.     P/E   Vital Signs Last 24 Hrs  T(C): 37.4 (17 May 2021 20:35), Max: 37.4 (17 May 2021 20:35)  T(F): 99.4 (17 May 2021 20:35), Max: 99.4 (17 May 2021 20:35)  HR: 90 (18 May 2021 00:00) (60 - 96)  BP: 108/65 (18 May 2021 00:00) (70/56 - 146/71)  BP(mean): 78 (18 May 2021 00:00) (55 - 94)  RR: 21 (18 May 2021 00:00) (12 - 35)  SpO2: 96% (18 May 2021 00:00) (96% - 100%)    General: Well developed. well nourished. not in distress  HEENT: AT, NC. PERRL. intact EOM. no throat erythema or exudate.   Neck: supple. no JVD. no palpable lymph nodes  Chest: CTA bilaterally  Heart: normal S1,S2. RRR. no heart murmur.  Abdomen: soft. non-tender. non-distended. + BS. no hernia or palpable masses.  Genital: not examined  Ext: no C/C/E. no calf tenderness.  Neuro: AAO x3. no focal weakness. no speech disorder  Skin: no rash    MEDICATIONS  (STANDING):  chlorhexidine 2% Cloths 1 Application(s) Topical <User Schedule>  pantoprazole Infusion 8 mG/Hr (10 mL/Hr) IV Continuous <Continuous>    MEDICATIONS  (PRN):  camphor 0.5%/menthol 0.5% Topical Lotion 1 Application(s) Topical four times a day PRN back pain    LABS :                            10.2   15.73 )-----------( 129      ( 17 May 2021 16:05 )             29.0     05-17    140  |  111<H>  |  44.0<H>  ----------------------------<  141<H>  4.3   |  22.0  |  0.91    Ca    8.1<L>      17 May 2021 05:19  Phos  3.2     05-17  Mg     1.7     05-17    TPro  4.8<L>  /  Alb  2.9<L>  /  TBili  0.6  /  DBili  x   /  AST  14  /  ALT  8   /  AlkPhos  68  05-17    CARDIAC MARKERS ( 16 May 2021 18:14 )  x     / <0.01 ng/mL / x     / x     / x      CARDIAC MARKERS ( 16 May 2021 14:49 )  x     / <0.01 ng/mL / x     / x     / x              PT/INR - ( 16 May 2021 14:49 )   PT: 13.6 sec;   INR: 1.18 ratio         PTT - ( 16 May 2021 14:49 )  PTT:32.2 sec    A/P    70 y/o male admitted for active upper GI bleed , was intubated for airway protection, s/p endoscopy in the ICU , now stable.     - Upper GI bleed, Patient underwent EGD yesterday for massive / active UGIB ( esophageal )  s/p hemoclipping and injection of diluted epinephrine. Gi has recommended protonix drip to continue , NPO for now as per GI , will give gentle iv fluids and follow GI recommendations in the am about po status.    - Parkinson's disease, po meds to be started when po status advanced by GI.    - H/h to be monitored closely and and transfuse as needed. last Hb 10.2.     - vc boots for dvt prophylaxis.     - hospitalist team will follow.

## 2021-05-19 LAB
ANION GAP SERPL CALC-SCNC: 9 MMOL/L — SIGNIFICANT CHANGE UP (ref 5–17)
BUN SERPL-MCNC: 22 MG/DL — HIGH (ref 8–20)
CALCIUM SERPL-MCNC: 7.4 MG/DL — LOW (ref 8.6–10.2)
CHLORIDE SERPL-SCNC: 109 MMOL/L — HIGH (ref 98–107)
CO2 SERPL-SCNC: 23 MMOL/L — SIGNIFICANT CHANGE UP (ref 22–29)
CREAT SERPL-MCNC: 0.72 MG/DL — SIGNIFICANT CHANGE UP (ref 0.5–1.3)
GLUCOSE SERPL-MCNC: 82 MG/DL — SIGNIFICANT CHANGE UP (ref 70–99)
HCT VFR BLD CALC: 27.3 % — LOW (ref 39–50)
HGB BLD-MCNC: 9.1 G/DL — LOW (ref 13–17)
MCHC RBC-ENTMCNC: 31.8 PG — SIGNIFICANT CHANGE UP (ref 27–34)
MCHC RBC-ENTMCNC: 33.3 GM/DL — SIGNIFICANT CHANGE UP (ref 32–36)
MCV RBC AUTO: 95.5 FL — SIGNIFICANT CHANGE UP (ref 80–100)
PLATELET # BLD AUTO: 110 K/UL — LOW (ref 150–400)
POTASSIUM SERPL-MCNC: 3.5 MMOL/L — SIGNIFICANT CHANGE UP (ref 3.5–5.3)
POTASSIUM SERPL-SCNC: 3.5 MMOL/L — SIGNIFICANT CHANGE UP (ref 3.5–5.3)
RBC # BLD: 2.86 M/UL — LOW (ref 4.2–5.8)
RBC # FLD: 15.2 % — HIGH (ref 10.3–14.5)
SODIUM SERPL-SCNC: 141 MMOL/L — SIGNIFICANT CHANGE UP (ref 135–145)
WBC # BLD: 9.02 K/UL — SIGNIFICANT CHANGE UP (ref 3.8–10.5)
WBC # FLD AUTO: 9.02 K/UL — SIGNIFICANT CHANGE UP (ref 3.8–10.5)

## 2021-05-19 PROCEDURE — 99233 SBSQ HOSP IP/OBS HIGH 50: CPT

## 2021-05-19 PROCEDURE — 99232 SBSQ HOSP IP/OBS MODERATE 35: CPT

## 2021-05-19 RX ADMIN — Medication 100 MILLIGRAM(S): at 17:54

## 2021-05-19 RX ADMIN — CARBIDOPA AND LEVODOPA 1 TABLET(S): 25; 100 TABLET ORAL at 22:43

## 2021-05-19 RX ADMIN — CARBIDOPA AND LEVODOPA 1 TABLET(S): 25; 100 TABLET ORAL at 20:14

## 2021-05-19 RX ADMIN — RASAGILINE 0.5 MILLIGRAM(S): 0.5 TABLET ORAL at 17:53

## 2021-05-19 RX ADMIN — Medication 100 MILLIGRAM(S): at 05:58

## 2021-05-19 RX ADMIN — CARBIDOPA AND LEVODOPA 1 TABLET(S): 25; 100 TABLET ORAL at 00:11

## 2021-05-19 RX ADMIN — CHLORHEXIDINE GLUCONATE 1 APPLICATION(S): 213 SOLUTION TOPICAL at 05:59

## 2021-05-19 RX ADMIN — CARBIDOPA AND LEVODOPA 1 TABLET(S): 25; 100 TABLET ORAL at 17:54

## 2021-05-19 RX ADMIN — CARBIDOPA AND LEVODOPA 1 TABLET(S): 25; 100 TABLET ORAL at 11:24

## 2021-05-19 RX ADMIN — CARBIDOPA AND LEVODOPA 1 TABLET(S): 25; 100 TABLET ORAL at 03:53

## 2021-05-19 RX ADMIN — PANTOPRAZOLE SODIUM 10 MG/HR: 20 TABLET, DELAYED RELEASE ORAL at 03:58

## 2021-05-19 RX ADMIN — CARBIDOPA AND LEVODOPA 1 TABLET(S): 25; 100 TABLET ORAL at 08:03

## 2021-05-19 NOTE — PROGRESS NOTE ADULT - ASSESSMENT
71 year old male with PMH HTN, Parkinson's disease and AVR presented with hematemesis and hematochezia/melena. CTA with large hiatal hernia and active bleeding noted in the stomach region. Hypotensive due to hemorrhagic shock and started on IV pressors. Received PRBCx1, Plateletsx1, FFPx1. 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  He was extubated and weaned of pressor and downgraded from MICU the following day.    Upper GI bleed with Acute blood loss anemia - s/p EGD, clipping, multiple transfusion. Hemodynamically stable with decrease in Hgb  - Started on Diet, advance slowly  - Continue PPI infusion x 72 hours as per GI recommendations  - Monitor Hgb, transfuse PRBC as needed  - GI follow up    Hemorrhagic shock - resolved    Parkinson's disease  - Resume home medications    Thrombocytopenia - mild interval decrease  - Monitor counts    Dyslipidemia  - Resume Statin once on PO diet    Prophylactic measure  - VCD for VTEp  - Mobilize with PT      Disposition - Awaiting completion Protonix infusion, stabilization of anemia

## 2021-05-19 NOTE — PROGRESS NOTE ADULT - SUBJECTIVE AND OBJECTIVE BOX
HOSPITALIST PROGRESS NOTE    PAOLO JUSTIN  [id number]  71yMale    Patient is a 71y old  Male who presents with a chief complaint of GIB (19 May 2021 11:00)      SUBJECTIVE:   Chart reviewed since last visit.  Patient seen and examined at bedside for GIB, ABLA, PD  Denies any nausea, vomiting or abdominal pain      OBJECTIVE:  Vital Signs Last 24 Hrs  T(C): 36.4 (19 May 2021 16:03), Max: 36.9 (19 May 2021 04:30)  T(F): 97.6 (19 May 2021 16:03), Max: 98.5 (19 May 2021 04:30)  HR: 71 (19 May 2021 16:03) (68 - 83)  BP: 99/65 (19 May 2021 16:03) (99/65 - 132/77)   RR: 18 (19 May 2021 16:03) (18 - 18)  SpO2: 98% (19 May 2021 16:03) (94% - 98%)    PHYSICAL EXAMINATION  General: Elderly male lying in bed, NAD  HEENT:  EOMI  NECK:  supple  CVS: regular rate and rhythm S1 S2  RESP:  CTAB  GI:  Soft nondistended nontender BS+  : No suprapubic tenderness  MSK:  FROM  CNS:  slow to move  INTEG:  warm dry skin  PSYCH:  fair mood    MONITOR:  CAPILLARY BLOOD GLUCOSE            I&O's Summary    18 May 2021 07:01  -  19 May 2021 07:00  --------------------------------------------------------  IN: 190 mL / OUT: 400 mL / NET: -210 mL                            9.1    9.02  )-----------( 110      ( 19 May 2021 07:29 )             27.3       05-19    141  |  109<H>  |  22.0<H>  ----------------------------<  82  3.5   |  23.0  |  0.72    Ca    7.4<L>      19 May 2021 07:29  Phos  2.2     05-18  Mg     1.6     05-18              Culture:    TTE:    RADIOLOGY        MEDICATIONS  (STANDING):  amantadine 100 milliGRAM(s) Oral two times a day  carbidopa/levodopa  25/100 1 Tablet(s) Oral four times a day  carbidopa/levodopa CR 50/200 1 Tablet(s) Oral <User Schedule>  chlorhexidine 2% Cloths 1 Application(s) Topical <User Schedule>  pantoprazole Infusion 8 mG/Hr (10 mL/Hr) IV Continuous <Continuous>  rasagiline Tablet 0.5 milliGRAM(s) Oral daily      MEDICATIONS  (PRN):  camphor 0.5%/menthol 0.5% Topical Lotion 1 Application(s) Topical four times a day PRN back pain  lidocaine   Patch 1 Patch Transdermal every 24 hours PRN Low Back Pain  ondansetron Injectable 4 milliGRAM(s) IV Push every 6 hours PRN Nausea and/or Vomiting

## 2021-05-19 NOTE — PROGRESS NOTE ADULT - SUBJECTIVE AND OBJECTIVE BOX
Patient seen and examined;  chart reviewed.  No melena or hematochezia.  No clinical bleeding.  EGD done 2 nights ago.  Large HH with a proxila ulcerated polyp in the upper stomach.  Clipped.  Epi injections done.  HGB down to 9 range.  Tolerated CLD thus far.      PAST MEDICAL & SURGICAL HISTORY:  HTN (hypertension)    S/P aortic valve replacement    Parkinsons disease    No significant past surgical history        ROS:  No Heartburn, regurgitation, dysphagia, odynophagia.  No dyspepsia  No abdominal pain.    No Nausea, vomiting.  No Bleeding.  No hematemesis.   No diarrhea.    No hematochesia.  No weight loss, anorexia.  No edema.      MEDICATIONS  (STANDING):  amantadine 100 milliGRAM(s) Oral two times a day  carbidopa/levodopa  25/100 1 Tablet(s) Oral four times a day  carbidopa/levodopa CR 50/200 1 Tablet(s) Oral <User Schedule>  chlorhexidine 2% Cloths 1 Application(s) Topical <User Schedule>  pantoprazole Infusion 8 mG/Hr (10 mL/Hr) IV Continuous <Continuous>  rasagiline Tablet 0.5 milliGRAM(s) Oral daily    MEDICATIONS  (PRN):  camphor 0.5%/menthol 0.5% Topical Lotion 1 Application(s) Topical four times a day PRN back pain  lidocaine   Patch 1 Patch Transdermal every 24 hours PRN Low Back Pain  ondansetron Injectable 4 milliGRAM(s) IV Push every 6 hours PRN Nausea and/or Vomiting      Allergies    No Known Allergies    Intolerances        Vital Signs Last 24 Hrs  T(C): 36.6 (19 May 2021 09:30), Max: 36.9 (19 May 2021 04:30)  T(F): 97.8 (19 May 2021 09:30), Max: 98.5 (19 May 2021 04:30)  HR: 71 (19 May 2021 09:30) (68 - 84)  BP: 130/82 (19 May 2021 09:30) (121/76 - 132/77)  BP(mean): --  RR: 18 (19 May 2021 09:30) (18 - 20)  SpO2: 95% (19 May 2021 09:30) (94% - 98%)    PHYSICAL EXAM:    GENERAL: NAD, well-groomed, well-developed  HEAD:  Atraumatic, Normocephalic  EYES: EOMI, PERRLA, conjunctiva and sclera clear  ENMT: No tonsillar erythema, exudates, or enlargement; Moist mucous membranes, Good dentition, No lesions  NECK: Supple, No JVD, Normal thyroid  CHEST/LUNG: Clear to percussion bilaterally; No rales, rhonchi, wheezing, or rubs  HEART: Regular rate and rhythm; No murmurs, rubs, or gallops  ABDOMEN: Soft, Nontender, Nondistended; Bowel sounds present  EXTREMITIES:  2+ Peripheral Pulses, No clubbing, cyanosis, or edema  LYMPH: No lymphadenopathy noted  SKIN: No rashes or lesions      LABS:                        9.1    9.02  )-----------( 110      ( 19 May 2021 07:29 )             27.3     05-19    141  |  109<H>  |  22.0<H>  ----------------------------<  82  3.5   |  23.0  |  0.72    Ca    7.4<L>      19 May 2021 07:29  Phos  2.2     05-18  Mg     1.6     05-18               RADIOLOGY & ADDITIONAL STUDIES:

## 2021-05-19 NOTE — PROGRESS NOTE ADULT - ASSESSMENT
Tolerated EGD and extubated and transferred to floor.  No clinical bleeding.  Neg SATISH.  Clinically improving.  BUN falling:  good.    Plan for diet advancement to DASH Diet.  Still on protonix drip for another day.    Plan check path.   Mobilize.  Serial Hgb's.

## 2021-05-20 ENCOUNTER — TRANSCRIPTION ENCOUNTER (OUTPATIENT)
Age: 71
End: 2021-05-20

## 2021-05-20 VITALS
RESPIRATION RATE: 18 BRPM | TEMPERATURE: 99 F | SYSTOLIC BLOOD PRESSURE: 121 MMHG | OXYGEN SATURATION: 99 % | DIASTOLIC BLOOD PRESSURE: 81 MMHG | HEART RATE: 87 BPM

## 2021-05-20 LAB
ANION GAP SERPL CALC-SCNC: 9 MMOL/L — SIGNIFICANT CHANGE UP (ref 5–17)
BUN SERPL-MCNC: 24 MG/DL — HIGH (ref 8–20)
CALCIUM SERPL-MCNC: 7.8 MG/DL — LOW (ref 8.6–10.2)
CHLORIDE SERPL-SCNC: 111 MMOL/L — HIGH (ref 98–107)
CO2 SERPL-SCNC: 25 MMOL/L — SIGNIFICANT CHANGE UP (ref 22–29)
CREAT SERPL-MCNC: 0.8 MG/DL — SIGNIFICANT CHANGE UP (ref 0.5–1.3)
GLUCOSE SERPL-MCNC: 114 MG/DL — HIGH (ref 70–99)
HCT VFR BLD CALC: 28.9 % — LOW (ref 39–50)
HGB BLD-MCNC: 9.7 G/DL — LOW (ref 13–17)
MCHC RBC-ENTMCNC: 31.9 PG — SIGNIFICANT CHANGE UP (ref 27–34)
MCHC RBC-ENTMCNC: 33.6 GM/DL — SIGNIFICANT CHANGE UP (ref 32–36)
MCV RBC AUTO: 95.1 FL — SIGNIFICANT CHANGE UP (ref 80–100)
PLATELET # BLD AUTO: 137 K/UL — LOW (ref 150–400)
POTASSIUM SERPL-MCNC: 3.6 MMOL/L — SIGNIFICANT CHANGE UP (ref 3.5–5.3)
POTASSIUM SERPL-SCNC: 3.6 MMOL/L — SIGNIFICANT CHANGE UP (ref 3.5–5.3)
RBC # BLD: 3.04 M/UL — LOW (ref 4.2–5.8)
RBC # FLD: 14.9 % — HIGH (ref 10.3–14.5)
SODIUM SERPL-SCNC: 145 MMOL/L — SIGNIFICANT CHANGE UP (ref 135–145)
WBC # BLD: 9.58 K/UL — SIGNIFICANT CHANGE UP (ref 3.8–10.5)
WBC # FLD AUTO: 9.58 K/UL — SIGNIFICANT CHANGE UP (ref 3.8–10.5)

## 2021-05-20 PROCEDURE — 99239 HOSP IP/OBS DSCHRG MGMT >30: CPT

## 2021-05-20 PROCEDURE — 99233 SBSQ HOSP IP/OBS HIGH 50: CPT

## 2021-05-20 RX ORDER — PANTOPRAZOLE SODIUM 20 MG/1
1 TABLET, DELAYED RELEASE ORAL
Qty: 30 | Refills: 0
Start: 2021-05-20 | End: 2021-06-18

## 2021-05-20 RX ORDER — BUPROPION HYDROCHLORIDE 150 MG/1
150 TABLET, EXTENDED RELEASE ORAL AT BEDTIME
Refills: 0 | Status: DISCONTINUED | OUTPATIENT
Start: 2021-05-20 | End: 2021-05-20

## 2021-05-20 RX ORDER — SUCRALFATE 1 G
1 TABLET ORAL
Refills: 0 | Status: DISCONTINUED | OUTPATIENT
Start: 2021-05-20 | End: 2021-05-20

## 2021-05-20 RX ORDER — ATORVASTATIN CALCIUM 80 MG/1
40 TABLET, FILM COATED ORAL AT BEDTIME
Refills: 0 | Status: DISCONTINUED | OUTPATIENT
Start: 2021-05-20 | End: 2021-05-20

## 2021-05-20 RX ADMIN — PANTOPRAZOLE SODIUM 10 MG/HR: 20 TABLET, DELAYED RELEASE ORAL at 12:05

## 2021-05-20 RX ADMIN — CARBIDOPA AND LEVODOPA 1 TABLET(S): 25; 100 TABLET ORAL at 05:57

## 2021-05-20 RX ADMIN — RASAGILINE 0.5 MILLIGRAM(S): 0.5 TABLET ORAL at 12:06

## 2021-05-20 RX ADMIN — PANTOPRAZOLE SODIUM 10 MG/HR: 20 TABLET, DELAYED RELEASE ORAL at 09:22

## 2021-05-20 RX ADMIN — CARBIDOPA AND LEVODOPA 1 TABLET(S): 25; 100 TABLET ORAL at 12:06

## 2021-05-20 RX ADMIN — CHLORHEXIDINE GLUCONATE 1 APPLICATION(S): 213 SOLUTION TOPICAL at 05:57

## 2021-05-20 RX ADMIN — Medication 100 MILLIGRAM(S): at 05:57

## 2021-05-20 RX ADMIN — Medication 100 MILLIGRAM(S): at 17:22

## 2021-05-20 RX ADMIN — CARBIDOPA AND LEVODOPA 1 TABLET(S): 25; 100 TABLET ORAL at 17:22

## 2021-05-20 RX ADMIN — PANTOPRAZOLE SODIUM 10 MG/HR: 20 TABLET, DELAYED RELEASE ORAL at 03:53

## 2021-05-20 NOTE — DISCHARGE NOTE NURSING/CASE MANAGEMENT/SOCIAL WORK - PATIENT PORTAL LINK FT
You can access the FollowMyHealth Patient Portal offered by [facility] by registering at the following website: [email or web address]. By joining [facility]’s FollowMyHealth portal, you will also be able to view your health information using other applications (apps) compatible with our system.

## 2021-05-20 NOTE — PROGRESS NOTE ADULT - SUBJECTIVE AND OBJECTIVE BOX
HOSPITALIST PROGRESS NOTE    PAOLO JUSTIN  [id number]  71yMale    Patient is a 71y old  Male who presents with a chief complaint of GIB (19 May 2021 16:10)      SUBJECTIVE:   Chart reviewed since last visit.  Patient seen and examined at bedside for GIB, ABLA, PD  Denies any nausea, vomiting, abdominal pain. had BM - dark  Denies any dyspnea, dizziness, chest pain or palpitations      OBJECTIVE:  Vital Signs Last 24 Hrs  T(C): 36.6 (20 May 2021 10:15), Max: 37.3 (20 May 2021 04:22)  T(F): 97.8 (20 May 2021 10:15), Max: 99.1 (20 May 2021 04:22)  HR: 90 (20 May 2021 10:15) (65 - 90)  BP: 113/78 (20 May 2021 10:15) (99/65 - 139/79)   RR: 18 (20 May 2021 10:15) (18 - 18)  SpO2: 95% (20 May 2021 10:15) (95% - 98%)    PHYSICAL EXAMINATION  General: Elderly male sitting in chair, NAD  HEENT:  EOMI  NECK:  supple  CVS: regular rate and rhythm S1 S2  RESP:  CTAB  GI:  Soft nondistended nontender BS+  : No suprapubic tenderness  MSK:  FROM  CNS:  slow to move  INTEG:  warm dry skin  PSYCH:  fair mood      MONITOR:  CAPILLARY BLOOD GLUCOSE            I&O's Summary                          9.7    9.58  )-----------( 137      ( 20 May 2021 07:33 )             28.9       05-20    145  |  111<H>  |  24.0<H>  ----------------------------<  114<H>  3.6   |  25.0  |  0.80    Ca    7.8<L>      20 May 2021 07:33              Culture:    TTE:    RADIOLOGY        MEDICATIONS  (STANDING):  amantadine 100 milliGRAM(s) Oral two times a day  carbidopa/levodopa  25/100 1 Tablet(s) Oral four times a day  carbidopa/levodopa CR 50/200 1 Tablet(s) Oral <User Schedule>  chlorhexidine 2% Cloths 1 Application(s) Topical <User Schedule>  pantoprazole Infusion 8 mG/Hr (10 mL/Hr) IV Continuous <Continuous>  rasagiline Tablet 0.5 milliGRAM(s) Oral daily      MEDICATIONS  (PRN):  camphor 0.5%/menthol 0.5% Topical Lotion 1 Application(s) Topical four times a day PRN back pain  lidocaine   Patch 1 Patch Transdermal every 24 hours PRN Low Back Pain  ondansetron Injectable 4 milliGRAM(s) IV Push every 6 hours PRN Nausea and/or Vomiting

## 2021-05-20 NOTE — DISCHARGE NOTE PROVIDER - NSDCCPCAREPLAN_GEN_ALL_CORE_FT
PRINCIPAL DISCHARGE DIAGNOSIS  Diagnosis: Gastric polyp  Assessment and Plan of Treatment: Continue diet and medications as prescribed  Follow up with GI      SECONDARY DISCHARGE DIAGNOSES  Diagnosis: Hemorrhagic shock  Assessment and Plan of Treatment: Resolved    Diagnosis: Upper GI bleeding  Assessment and Plan of Treatment: Continue medications  Follow up with GI    Diagnosis: Anemia due to acute blood loss  Assessment and Plan of Treatment: Iron and folate supplements    Diagnosis: Dyslipidemia  Assessment and Plan of Treatment: Continue diet and home medications    Diagnosis: Parkinson's disease  Assessment and Plan of Treatment: Continue home medications

## 2021-05-20 NOTE — DISCHARGE NOTE PROVIDER - CARE PROVIDER_API CALL
Vince Griffin)  Gastroenterology; Internal Medicine  [address] [place] [address]  Phone: [phone]  Fax: [phone]  Follow Up Time:

## 2021-05-20 NOTE — PROGRESS NOTE ADULT - ASSESSMENT
Status post EGD over the weekend which showed a bleeding polypoid lesion at gastric cardia clipped w/o recurrent bleeding. Stable for D/C from a GI POV on Pantoprazole 40 mg./d. and Sucralfate 1 gram PO QID with OPT f/u with Dr. Vince Griffin in 3 to 4 weeks for eventual repeat EGD and possible EUS to further evaluate this polypoid lesion.

## 2021-05-20 NOTE — PROGRESS NOTE ADULT - PROVIDER SPECIALTY LIST ADULT
Gastroenterology
Gastroenterology
Hospitalist
MICU
Gastroenterology
Gastroenterology

## 2021-05-20 NOTE — PROGRESS NOTE ADULT - ASSESSMENT
71 year old male with PMH HTN, Parkinson's disease and AVR presented with hematemesis and hematochezia/melena. CTA with large hiatal hernia and active bleeding noted in the stomach region. Hypotensive due to hemorrhagic shock and started on IV pressors. Received PRBCx1, Plateletsx1, FFPx1. 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  He was extubated and weaned of pressor and downgraded from MICU the following day.    Upper GI bleed with Acute blood loss anemia - s/p EGD, clipping, multiple transfusion. Hemodynamically stable with decrease in Hgb  - Started on Diet, advance slowly  - Continue PPI infusion x 72 hours as per GI recommendations  - Monitor Hgb, transfuse PRBC as needed  - GI follow up; if cleared may be discharge home    Hemorrhagic shock - resolved    Parkinson's disease  - Resumed home medications    Thrombocytopenia - mild interval decrease  - Monitor counts    Dyslipidemia  - Resume Statin once on PO diet    Prophylactic measure  - VCD for VTEp  - Mobilize with PT      Disposition - Awaiting GO clearance - discharge home

## 2021-05-20 NOTE — PROGRESS NOTE ADULT - SUBJECTIVE AND OBJECTIVE BOX
Pt seen and examined. No recurrent GI bleeding. No GI complaints presently.     REVIEW OF SYSTEMS:  Constitutional: No fever, weight loss or fatigue  Cardiovascular: No chest pain, palpitations, dizziness or leg swelling  Gastrointestinal: As noted above. No abdominal or epigastric pain. No nausea, vomiting or hematemesis; No diarrhea or constipation. No melena or hematochezia.  Skin: No itching, burning, rashes or lesions       MEDICATIONS:  MEDICATIONS  (STANDING):  amantadine 100 milliGRAM(s) Oral two times a day  atorvastatin 40 milliGRAM(s) Oral at bedtime  buPROPion XL (24-Hour) . 150 milliGRAM(s) Oral at bedtime  carbidopa/levodopa  25/100 1 Tablet(s) Oral four times a day  carbidopa/levodopa CR 50/200 1 Tablet(s) Oral <User Schedule>  chlorhexidine 2% Cloths 1 Application(s) Topical <User Schedule>  pantoprazole Infusion 8 mG/Hr (10 mL/Hr) IV Continuous <Continuous>  rasagiline Tablet 0.5 milliGRAM(s) Oral daily    MEDICATIONS  (PRN):  camphor 0.5%/menthol 0.5% Topical Lotion 1 Application(s) Topical four times a day PRN back pain  lidocaine   Patch 1 Patch Transdermal every 24 hours PRN Low Back Pain  ondansetron Injectable 4 milliGRAM(s) IV Push every 6 hours PRN Nausea and/or Vomiting      Allergies    No Known Allergies    Intolerances        Vital Signs Last 24 Hrs  T(C): 36.6 (20 May 2021 10:15), Max: 37.3 (20 May 2021 04:22)  T(F): 97.8 (20 May 2021 10:15), Max: 99.1 (20 May 2021 04:22)  HR: 90 (20 May 2021 10:15) (65 - 90)  BP: 113/78 (20 May 2021 10:15) (99/65 - 139/79)  BP(mean): --  RR: 18 (20 May 2021 10:15) (18 - 18)  SpO2: 95% (20 May 2021 10:15) (95% - 98%)      PHYSICAL EXAM:    General: Well developed; well nourished; in no acute distress  HEENT: MMM, conjunctiva pink and sclera anicteric.  Lungs: clear to auscultation bilaterally.  Cor: RRR S1, S2 only.  Gastrointestinal: Abdomen: Soft non-tender non-distended; Normal bowel sounds; No hepatosplenomegaly.  Extremities: no cyanosis, clubbing or edema.  Skin: Warm and dry. No obvious rash  Neuro: Pt. a + o x 3.    LABS:  CBC Full  -  ( 20 May 2021 07:33 )  WBC Count : 9.58 K/uL  RBC Count : 3.04 M/uL  Hemoglobin : 9.7 g/dL  Hematocrit : 28.9 %  Platelet Count - Automated : 137 K/uL  Mean Cell Volume : 95.1 fl  Mean Cell Hemoglobin : 31.9 pg  Mean Cell Hemoglobin Concentration : 33.6 gm/dL  Auto Neutrophil # : x  Auto Lymphocyte # : x  Auto Monocyte # : x  Auto Eosinophil # : x  Auto Basophil # : x  Auto Neutrophil % : x  Auto Lymphocyte % : x  Auto Monocyte % : x  Auto Eosinophil % : x  Auto Basophil % : x    05-20    145  |  111<H>  |  24.0<H>  ----------------------------<  114<H>  3.6   |  25.0  |  0.80    Ca    7.8<L>      20 May 2021 07:33                        RADIOLOGY & ADDITIONAL STUDIES (The following images were personally reviewed):

## 2021-05-20 NOTE — DISCHARGE NOTE PROVIDER - HOSPITAL COURSE
71 year old male with PMH HTN, Parkinson's disease and AVR presented with hematemesis and hematochezia/melena. CTA with large hiatal hernia and active bleeding noted in the stomach region. Hypotensive due to hemorrhagic shock and started on IV pressors. Received PRBCx1, Plateletsx1, FFPx1. 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 Hemorrhagic shock resolved and he was extubated and weaned of pressor and downgraded from MICU the following day. No further episodes of GI bleeding. He was started on diet which is being tolerated. PPI infusion continued x 72 hours and then switched over to PO. Acute blood loss anemia is stable.  Also noted to have mild thrombocytopenia which has been stable  He was resumed on his home medications for Parkinson's disease.  Evaluated by PT with recommendations for home with assistance.  Discharge time - 38 minutes

## 2021-05-21 LAB
ANION GAP SERPL CALC-SCNC: 12 MMOL/L — SIGNIFICANT CHANGE UP (ref 5–17)
BUN SERPL-MCNC: <3 MG/DL — LOW (ref 8–20)
CALCIUM SERPL-MCNC: 8.8 MG/DL — SIGNIFICANT CHANGE UP (ref 8.6–10.2)
CHLORIDE SERPL-SCNC: 98 MMOL/L — SIGNIFICANT CHANGE UP (ref 98–107)
CO2 SERPL-SCNC: 27 MMOL/L — SIGNIFICANT CHANGE UP (ref 22–29)
CREAT SERPL-MCNC: 0.64 MG/DL — SIGNIFICANT CHANGE UP (ref 0.5–1.3)
GLUCOSE SERPL-MCNC: 83 MG/DL — SIGNIFICANT CHANGE UP (ref 70–99)
HCT VFR BLD CALC: 31.9 % — LOW (ref 39–50)
HGB BLD-MCNC: 10.6 G/DL — LOW (ref 13–17)
MCHC RBC-ENTMCNC: 33.2 GM/DL — SIGNIFICANT CHANGE UP (ref 32–36)
MCHC RBC-ENTMCNC: 34.2 PG — HIGH (ref 27–34)
MCV RBC AUTO: 102.9 FL — HIGH (ref 80–100)
PLATELET # BLD AUTO: 225 K/UL — SIGNIFICANT CHANGE UP (ref 150–400)
POTASSIUM SERPL-MCNC: 3.4 MMOL/L — LOW (ref 3.5–5.3)
POTASSIUM SERPL-SCNC: 3.4 MMOL/L — LOW (ref 3.5–5.3)
RBC # BLD: 3.1 M/UL — LOW (ref 4.2–5.8)
RBC # FLD: 13.2 % — SIGNIFICANT CHANGE UP (ref 10.3–14.5)
SODIUM SERPL-SCNC: 137 MMOL/L — SIGNIFICANT CHANGE UP (ref 135–145)
WBC # BLD: 8.62 K/UL — SIGNIFICANT CHANGE UP (ref 3.8–10.5)
WBC # FLD AUTO: 8.62 K/UL — SIGNIFICANT CHANGE UP (ref 3.8–10.5)

## 2021-05-21 PROCEDURE — 71045 X-RAY EXAM CHEST 1 VIEW: CPT

## 2021-05-21 PROCEDURE — U0003: CPT

## 2021-05-21 PROCEDURE — 86769 SARS-COV-2 COVID-19 ANTIBODY: CPT

## 2021-05-21 PROCEDURE — 97116 GAIT TRAINING THERAPY: CPT

## 2021-05-21 PROCEDURE — P9011: CPT

## 2021-05-21 PROCEDURE — 83735 ASSAY OF MAGNESIUM: CPT

## 2021-05-21 PROCEDURE — 85018 HEMOGLOBIN: CPT

## 2021-05-21 PROCEDURE — 85610 PROTHROMBIN TIME: CPT

## 2021-05-21 PROCEDURE — P9100: CPT

## 2021-05-21 PROCEDURE — U0005: CPT

## 2021-05-21 PROCEDURE — 82435 ASSAY OF BLOOD CHLORIDE: CPT

## 2021-05-21 PROCEDURE — 83036 HEMOGLOBIN GLYCOSYLATED A1C: CPT

## 2021-05-21 PROCEDURE — 96374 THER/PROPH/DIAG INJ IV PUSH: CPT

## 2021-05-21 PROCEDURE — 86985 SPLIT BLOOD OR PRODUCTS: CPT

## 2021-05-21 PROCEDURE — 85025 COMPLETE CBC W/AUTO DIFF WBC: CPT

## 2021-05-21 PROCEDURE — 82330 ASSAY OF CALCIUM: CPT

## 2021-05-21 PROCEDURE — 84484 ASSAY OF TROPONIN QUANT: CPT

## 2021-05-21 PROCEDURE — 36415 COLL VENOUS BLD VENIPUNCTURE: CPT

## 2021-05-21 PROCEDURE — 84132 ASSAY OF SERUM POTASSIUM: CPT

## 2021-05-21 PROCEDURE — 84100 ASSAY OF PHOSPHORUS: CPT

## 2021-05-21 PROCEDURE — 85027 COMPLETE CBC AUTOMATED: CPT

## 2021-05-21 PROCEDURE — 86850 RBC ANTIBODY SCREEN: CPT

## 2021-05-21 PROCEDURE — 94002 VENT MGMT INPAT INIT DAY: CPT

## 2021-05-21 PROCEDURE — 86803 HEPATITIS C AB TEST: CPT

## 2021-05-21 PROCEDURE — 74177 CT ABD & PELVIS W/CONTRAST: CPT

## 2021-05-21 PROCEDURE — 84295 ASSAY OF SERUM SODIUM: CPT

## 2021-05-21 PROCEDURE — 80048 BASIC METABOLIC PNL TOTAL CA: CPT

## 2021-05-21 PROCEDURE — 97530 THERAPEUTIC ACTIVITIES: CPT

## 2021-05-21 PROCEDURE — 96375 TX/PRO/DX INJ NEW DRUG ADDON: CPT

## 2021-05-21 PROCEDURE — P9037: CPT

## 2021-05-21 PROCEDURE — 86901 BLOOD TYPING SEROLOGIC RH(D): CPT

## 2021-05-21 PROCEDURE — 99285 EMERGENCY DEPT VISIT HI MDM: CPT | Mod: 25

## 2021-05-21 PROCEDURE — 94003 VENT MGMT INPAT SUBQ DAY: CPT

## 2021-05-21 PROCEDURE — 83605 ASSAY OF LACTIC ACID: CPT

## 2021-05-21 PROCEDURE — 80053 COMPREHEN METABOLIC PANEL: CPT

## 2021-05-21 PROCEDURE — P9016: CPT

## 2021-05-21 PROCEDURE — 85730 THROMBOPLASTIN TIME PARTIAL: CPT

## 2021-05-21 PROCEDURE — 82803 BLOOD GASES ANY COMBINATION: CPT

## 2021-05-21 PROCEDURE — 85014 HEMATOCRIT: CPT

## 2021-05-21 PROCEDURE — 82272 OCCULT BLD FECES 1-3 TESTS: CPT

## 2021-05-21 PROCEDURE — 86900 BLOOD TYPING SEROLOGIC ABO: CPT

## 2021-05-21 PROCEDURE — 82947 ASSAY GLUCOSE BLOOD QUANT: CPT

## 2021-05-21 PROCEDURE — 86923 COMPATIBILITY TEST ELECTRIC: CPT

## 2022-10-23 NOTE — ED ADULT NURSE NOTE - CAS EDN INTEG ASSESS
Dr. Bushra Webster to bedside. Verbal order to remove NRB mask and place pt. On high flow nasal cannula.      Юлия Pettit RN  10/22/22 [id number] WDL

## 2023-10-03 NOTE — DISCHARGE NOTE NURSING/CASE MANAGEMENT/SOCIAL WORK - CASE MANAGER'S NAME
TIFFANI ROBIN RN CCC [phone] Z Plasty Text: The lesion was extirpated to the level of the fat with a #15 scalpel blade.  Given the location of the defect, shape of the defect and the proximity to free margins a Z-plasty was deemed most appropriate for repair.  Using a sterile surgical marker, the appropriate transposition arms of the Z-plasty were drawn incorporating the defect and placing the expected incisions within the relaxed skin tension lines where possible.    The area thus outlined was incised deep to adipose tissue with a #15 scalpel blade.  The skin margins were undermined to an appropriate distance in all directions utilizing iris scissors.  The opposing transposition arms were then transposed into place in opposite direction and anchored with interrupted buried subcutaneous sutures.

## 2025-04-30 NOTE — PHYSICAL THERAPY INITIAL EVALUATION ADULT - IMPAIRMENTS FOUND, PT EVAL
Call placed to patient notified medication sent.    bright red aerobic capacity/endurance/gait, locomotion, and balance